# Patient Record
Sex: FEMALE | Race: WHITE | Employment: OTHER | URBAN - METROPOLITAN AREA
[De-identification: names, ages, dates, MRNs, and addresses within clinical notes are randomized per-mention and may not be internally consistent; named-entity substitution may affect disease eponyms.]

---

## 2020-09-18 ENCOUNTER — HOSPITAL ENCOUNTER (INPATIENT)
Age: 50
LOS: 1 days | Discharge: HOME | DRG: 055 | End: 2020-09-19
Attending: EMERGENCY MEDICINE | Admitting: SURGERY
Payer: COMMERCIAL

## 2020-09-18 ENCOUNTER — APPOINTMENT (OUTPATIENT)
Dept: GENERAL RADIOLOGY | Age: 50
DRG: 055 | End: 2020-09-18
Payer: COMMERCIAL

## 2020-09-18 ENCOUNTER — APPOINTMENT (OUTPATIENT)
Dept: CT IMAGING | Age: 50
DRG: 055 | End: 2020-09-18
Payer: COMMERCIAL

## 2020-09-18 PROBLEM — T14.90XA TRAUMA: Status: ACTIVE | Noted: 2020-09-18

## 2020-09-18 PROBLEM — S06.5XAA SUBDURAL HEMATOMA (HCC): Status: ACTIVE | Noted: 2020-09-18

## 2020-09-18 LAB
ABO/RH: NORMAL
ACETAMINOPHEN LEVEL: <5 MCG/ML (ref 10–30)
ALBUMIN SERPL-MCNC: 4.1 G/DL (ref 3.5–5.2)
ALP BLD-CCNC: 63 U/L (ref 35–104)
ALT SERPL-CCNC: 11 U/L (ref 0–32)
ANION GAP SERPL CALCULATED.3IONS-SCNC: 14 MMOL/L (ref 7–16)
ANTIBODY SCREEN: NORMAL
APTT: 26.6 SEC (ref 24.5–35.1)
AST SERPL-CCNC: 18 U/L (ref 0–31)
B.E.: -6.6 MMOL/L (ref -3–3)
BILIRUB SERPL-MCNC: 0.2 MG/DL (ref 0–1.2)
BUN BLDV-MCNC: 17 MG/DL (ref 6–20)
CALCIUM SERPL-MCNC: 9.1 MG/DL (ref 8.6–10.2)
CHLORIDE BLD-SCNC: 102 MMOL/L (ref 98–107)
CO2: 21 MMOL/L (ref 22–29)
COHB: 0.3 % (ref 0–1.5)
COMMENT: ABNORMAL
CREAT SERPL-MCNC: 0.7 MG/DL (ref 0.5–1)
CRITICAL: ABNORMAL
DATE ANALYZED: ABNORMAL
DATE OF COLLECTION: ABNORMAL
ETHANOL: <10 MG/DL (ref 0–0.08)
GFR AFRICAN AMERICAN: >60
GFR NON-AFRICAN AMERICAN: >60 ML/MIN/1.73
GLUCOSE BLD-MCNC: 279 MG/DL (ref 74–99)
HCG QUALITATIVE: NEGATIVE
HCO3: 18.8 MMOL/L (ref 22–26)
HCT VFR BLD CALC: 41.3 % (ref 34–48)
HEMOGLOBIN: 13.3 G/DL (ref 11.5–15.5)
HHB: 0.6 % (ref 0–5)
INR BLD: 1
LAB: ABNORMAL
LACTIC ACID: 1.2 MMOL/L (ref 0.5–2.2)
Lab: ABNORMAL
MCH RBC QN AUTO: 29.4 PG (ref 26–35)
MCHC RBC AUTO-ENTMCNC: 32.2 % (ref 32–34.5)
MCV RBC AUTO: 91.4 FL (ref 80–99.9)
METER GLUCOSE: 350 MG/DL (ref 74–99)
METHB: 0.4 % (ref 0–1.5)
MODE: ABNORMAL
O2 CONTENT: 19.9 ML/DL
O2 SATURATION: 99.4 % (ref 92–98.5)
O2HB: 98.7 % (ref 94–97)
OPERATOR ID: ABNORMAL
PATIENT TEMP: 37 C
PCO2: 37.1 MMHG (ref 35–45)
PDW BLD-RTO: 13.2 FL (ref 11.5–15)
PH BLOOD GAS: 7.32 (ref 7.35–7.45)
PLATELET # BLD: 152 E9/L (ref 130–450)
PMV BLD AUTO: 13.5 FL (ref 7–12)
PO2: 404.7 MMHG (ref 75–100)
POTASSIUM SERPL-SCNC: 3.75 MMOL/L (ref 3.5–5)
POTASSIUM SERPL-SCNC: 4.1 MMOL/L (ref 3.5–5)
PROTHROMBIN TIME: 10.8 SEC (ref 9.3–12.4)
RBC # BLD: 4.52 E12/L (ref 3.5–5.5)
SALICYLATE, SERUM: <0.3 MG/DL (ref 0–30)
SODIUM BLD-SCNC: 137 MMOL/L (ref 132–146)
SOURCE, BLOOD GAS: ABNORMAL
THB: 13.6 G/DL (ref 11.5–16.5)
TIME ANALYZED: 930
TOTAL PROTEIN: 6.6 G/DL (ref 6.4–8.3)
TRICYCLIC ANTIDEPRESSANTS SCREEN SERUM: NEGATIVE NG/ML
WBC # BLD: 13 E9/L (ref 4.5–11.5)

## 2020-09-18 PROCEDURE — 72170 X-RAY EXAM OF PELVIS: CPT

## 2020-09-18 PROCEDURE — 6810039000 HC L1 TRAUMA ALERT

## 2020-09-18 PROCEDURE — 84132 ASSAY OF SERUM POTASSIUM: CPT

## 2020-09-18 PROCEDURE — 72125 CT NECK SPINE W/O DYE: CPT

## 2020-09-18 PROCEDURE — 2060000000 HC ICU INTERMEDIATE R&B

## 2020-09-18 PROCEDURE — 36600 WITHDRAWAL OF ARTERIAL BLOOD: CPT | Performed by: SURGERY

## 2020-09-18 PROCEDURE — 70450 CT HEAD/BRAIN W/O DYE: CPT

## 2020-09-18 PROCEDURE — 99222 1ST HOSP IP/OBS MODERATE 55: CPT | Performed by: SURGERY

## 2020-09-18 PROCEDURE — 2580000003 HC RX 258: Performed by: RADIOLOGY

## 2020-09-18 PROCEDURE — 74176 CT ABD & PELVIS W/O CONTRAST: CPT

## 2020-09-18 PROCEDURE — 71250 CT THORAX DX C-: CPT

## 2020-09-18 PROCEDURE — G0390 TRAUMA RESPONS W/HOSP CRITI: HCPCS

## 2020-09-18 PROCEDURE — 99222 1ST HOSP IP/OBS MODERATE 55: CPT | Performed by: NEUROLOGICAL SURGERY

## 2020-09-18 PROCEDURE — 71045 X-RAY EXAM CHEST 1 VIEW: CPT

## 2020-09-18 PROCEDURE — G0378 HOSPITAL OBSERVATION PER HR: HCPCS

## 2020-09-18 PROCEDURE — 99291 CRITICAL CARE FIRST HOUR: CPT

## 2020-09-18 PROCEDURE — 82805 BLOOD GASES W/O2 SATURATION: CPT

## 2020-09-18 RX ORDER — LEVETIRACETAM 10 MG/ML
1000 INJECTION INTRAVASCULAR ONCE
Status: DISCONTINUED | OUTPATIENT
Start: 2020-09-18 | End: 2020-09-19 | Stop reason: HOSPADM

## 2020-09-18 RX ORDER — DEXTROSE MONOHYDRATE 25 G/50ML
12.5 INJECTION, SOLUTION INTRAVENOUS PRN
Status: DISCONTINUED | OUTPATIENT
Start: 2020-09-18 | End: 2020-09-19 | Stop reason: HOSPADM

## 2020-09-18 RX ORDER — NICOTINE POLACRILEX 4 MG
15 LOZENGE BUCCAL PRN
Status: DISCONTINUED | OUTPATIENT
Start: 2020-09-18 | End: 2020-09-19 | Stop reason: HOSPADM

## 2020-09-18 RX ORDER — ONDANSETRON 2 MG/ML
4 INJECTION INTRAMUSCULAR; INTRAVENOUS EVERY 6 HOURS PRN
Status: DISCONTINUED | OUTPATIENT
Start: 2020-09-18 | End: 2020-09-19 | Stop reason: HOSPADM

## 2020-09-18 RX ORDER — POLYETHYLENE GLYCOL 3350 17 G/17G
17 POWDER, FOR SOLUTION ORAL DAILY PRN
Status: DISCONTINUED | OUTPATIENT
Start: 2020-09-18 | End: 2020-09-19 | Stop reason: HOSPADM

## 2020-09-18 RX ORDER — SODIUM CHLORIDE 0.9 % (FLUSH) 0.9 %
10 SYRINGE (ML) INJECTION PRN
Status: DISCONTINUED | OUTPATIENT
Start: 2020-09-18 | End: 2020-09-19 | Stop reason: HOSPADM

## 2020-09-18 RX ORDER — PROMETHAZINE HYDROCHLORIDE 25 MG/1
12.5 TABLET ORAL EVERY 6 HOURS PRN
Status: DISCONTINUED | OUTPATIENT
Start: 2020-09-18 | End: 2020-09-19 | Stop reason: HOSPADM

## 2020-09-18 RX ORDER — SODIUM CHLORIDE 0.9 % (FLUSH) 0.9 %
10 SYRINGE (ML) INJECTION EVERY 12 HOURS SCHEDULED
Status: DISCONTINUED | OUTPATIENT
Start: 2020-09-18 | End: 2020-09-19 | Stop reason: HOSPADM

## 2020-09-18 RX ORDER — SODIUM CHLORIDE 0.9 % (FLUSH) 0.9 %
10 SYRINGE (ML) INJECTION ONCE
Status: DISCONTINUED | OUTPATIENT
Start: 2020-09-18 | End: 2020-09-18

## 2020-09-18 RX ORDER — ACETAMINOPHEN 325 MG/1
650 TABLET ORAL EVERY 4 HOURS PRN
Status: DISCONTINUED | OUTPATIENT
Start: 2020-09-18 | End: 2020-09-19 | Stop reason: HOSPADM

## 2020-09-18 RX ORDER — DEXTROSE MONOHYDRATE 50 MG/ML
100 INJECTION, SOLUTION INTRAVENOUS PRN
Status: DISCONTINUED | OUTPATIENT
Start: 2020-09-18 | End: 2020-09-19 | Stop reason: HOSPADM

## 2020-09-18 RX ADMIN — Medication 10 ML: at 11:11

## 2020-09-18 ASSESSMENT — ENCOUNTER SYMPTOMS
CHEST TIGHTNESS: 0
RHINORRHEA: 0
COUGH: 0
RESPIRATORY NEGATIVE: 1
TROUBLE SWALLOWING: 0
ABDOMINAL PAIN: 0
BLOOD IN STOOL: 0
ALLERGIC/IMMUNOLOGIC NEGATIVE: 1
EYES NEGATIVE: 1
GASTROINTESTINAL NEGATIVE: 1
VOMITING: 0
NAUSEA: 0
WHEEZING: 0
EYE PAIN: 0
BACK PAIN: 0
DIARRHEA: 0
SHORTNESS OF BREATH: 0

## 2020-09-18 ASSESSMENT — PAIN SCALES - GENERAL: PAINLEVEL_OUTOF10: 0

## 2020-09-18 NOTE — CONSULTS
[address]   Chief Complaint   Patient presents with   [facility] Trauma     Fall down 15 steps. .     Chief Complaint: neck pain, headache and subdural hematoma    HPI:   Mony Glass is a 48 y.o.  female who has history of mild neck pain and headache after a fall down steps earlier today. No new numbness, weakness, vision change or other concern  Pain is achy in character and aggravated by movement, relieved by rest. Head CT reveals right EDH and skull fx. No past medical history on file. No past surgical history on file. No family history on file.    Social History     Socioeconomic History    Marital status: Unknown     Spouse name: Not on file    Number of children: Not on file    Years of education: Not on file    Highest education level: Not on file   Occupational History    Not on file   Social Needs    Financial resource strain: Not on file    Food insecurity     Worry: Not on file     Inability: Not on file    Transportation needs     Medical: Not on file     Non-medical: Not on file   Tobacco Use    Smoking status: Not on file   Substance and Sexual Activity    Alcohol use: Not on file    Drug use: Not on file    Sexual activity: Not on file   Lifestyle    Physical activity     Days per week: Not on file     Minutes per session: Not on file    Stress: Not on file   Relationships    Social connections     Talks on phone: Not on file     Gets together: Not on file     Attends Rastafarian service: Not on file     Active member of club or organization: Not on file     Attends meetings of clubs or organizations: Not on file     Relationship status: Not on file    Intimate partner violence     Fear of current or ex partner: Not on file     Emotionally abused: Not on file     Physically abused: Not on file     Forced sexual activity: Not on file   Other Topics Concern    Not on file   Social History Narrative    Not on file       Medications:   Current Facility-Administered Medications   Medication Dose Route Frequency Provider Last Rate Last Dose    levetiracetam (KEPPRA) 1000 mg/100 mL IVPB  1,000 mg Intravenous Once Anusha Miller MD         Current Outpatient Medications   Medication Sig Dispense Refill    Insulin Pump - insulin lispro Inject into the skin continuous Insulin-to-Carb Ratio (ICR): Insulin Sensitivity Factor (ISF):  mg/dL per unit of insulin  Target Blood Glucose: mg/dL  Bolus Frequency:          Allergies:    Patient has no allergy information on record. Review of Systems   Constitutional: Negative. HENT: Negative. Eyes: Negative. Respiratory: Negative. Cardiovascular: Negative. Gastrointestinal: Negative. Endocrine: Negative. Genitourinary: Negative. Musculoskeletal: Negative. Skin: Negative. Allergic/Immunologic: Negative. Neurological: Negative. Hematological: Negative. Psychiatric/Behavioral: Negative. Physical Exam     /74   Pulse 88   Resp 21   Ht 5' 6\" (1.676 m)   Wt 125 lb (56.7 kg)   SpO2 99%   BMI 20.18 kg/m²    Physical Exam   Constitutional: Appears well-nourished. Head: Normocephalic and atraumatic. Eyes: EOM are normal. Pupils are equal, round, and reactive to light. Neck: Normal range of motion. No tracheal deviation present. Cardiovascular: Normal rate. Pulmonary/Chest: No stridor. Abdominal: No distension. Neurological:   Oriented to person, place, and time. CN 3-12 intact  Motor strength full  Sensation intact to light touch   Skin: Skin is warm and dry.    Psychiatric: Thought content normal.     Assessment:   · 48year old female with right SDH/skull fx after fall earlier today - stable exam    Plan:  · No anticoagulants  · No surgical intervention planned  · Serial neuro exams  · F/u in clinic in 4 weeks with head CT      Electronically signed by ANISA Arita on 9/18/2020 at 11:45 AM     I have interviewed and examined the patient and agree with above. She has right SDH. Stable.   Will follow    Toñito Burns

## 2020-09-18 NOTE — ED PROVIDER NOTES
[facility]  eMERGENCY dEPARTMENT eNCOUnter      Pt Name: Ortiz Anand  MRN: [id number]  [place] 1970  Date of evaluation: 9/18/2020  Provider: Jodi Grant MD     CHIEF COMPLAINT       Chief Complaint   Patient presents with    Trauma     Fall down 15 steps. HISTORY OF PRESENT ILLNESS   (Location/Symptom, Timing/Onset,Context/Setting, Quality, Duration, Modifying Factors, Severity) Note limiting factors. Patient presents here for evaluation of trauma. Patient is an insulin-dependent diabetic type I. She was found at the bottom of 15 concrete stairs. There was a large puddle of blood. Her blood sugar was 24. There was a reported loss of consciousness. Patient was given glucagon possible oral glucose. She now is awake and alert. She is somewhat argumentative and repetitive in her questions. She arrived on a cot with no c-collar. C-collar was placed on arrival.  She does have a laceration to the right occipital area. Apparently there was a large amount of blood at the scene but bleeding is currently controlled. She denies any chest pain or shortness of breath. He denies any other pain. Patient was made a level 2 trauma prior to arrival and trauma team was present on patient's arrival          Ortiz Anand is a 48 y.o. female who presents to the emergency department      Nursing Notes were reviewed. REVIEW OF SYSTEMS    (2+ for4; 10+ for level 5)     Review of Systems   Constitutional: Negative for appetite change, chills, fatigue, fever and unexpected weight change. HENT: Negative for congestion, drooling, nosebleeds, rhinorrhea and trouble swallowing. Eyes: Negative for pain and visual disturbance. Respiratory: Negative for cough, chest tightness, shortness of breath and wheezing. Cardiovascular: Negative for chest pain, palpitations and leg swelling.    Gastrointestinal: Negative for abdominal pain, blood in stool, diarrhea, nausea and vomiting. Endocrine: Negative for polydipsia and polyuria. Genitourinary: Negative for difficulty urinating, dysuria, flank pain, frequency, hematuria and urgency. Musculoskeletal: Negative for arthralgias, back pain, myalgias and neck pain. Skin: Positive for wound. Negative for pallor and rash. Allergic/Immunologic: Negative for environmental allergies. Neurological: Negative for dizziness, syncope, speech difficulty, weakness, light-headedness, numbness and headaches. Hematological: Does not bruise/bleed easily. Psychiatric/Behavioral: Negative for confusion and decreased concentration. All other systems reviewed and are negative. PAST MEDICAL HISTORY   No past medical history on file. SURGICALHISTORY     No past surgical history on file. CURRENT MEDICATIONS       Previous Medications    No medications on file            Patient has no allergy information on record. FAMILY HISTORY     No family history on file.        SOCIAL HISTORY       Social History     Socioeconomic History    Marital status: Unknown     Spouse name: Not on file    Number of children: Not on file    Years of education: Not on file    Highest education level: Not on file   Occupational History    Not on file   Social Needs    Financial resource strain: Not on file    Food insecurity     Worry: Not on file     Inability: Not on file    Transportation needs     Medical: Not on file     Non-medical: Not on file   Tobacco Use    Smoking status: Not on file   Substance and Sexual Activity    Alcohol use: Not on file    Drug use: Not on file    Sexual activity: Not on file   Lifestyle    Physical activity     Days per week: Not on file     Minutes per session: Not on file    Stress: Not on file   Relationships    Social connections     Talks on phone: Not on file     Gets together: Not on file     Attends Cheondoism service: Not on file     Active member of club or organization: Not on file     Attends meetings of clubs or organizations: Not on file     Relationship status: Not on file    Intimate partner violence     Fear of current or ex partner: Not on file     Emotionally abused: Not on file     Physically abused: Not on file     Forced sexual activity: Not on file   Other Topics Concern    Not on file   Social History Narrative    Not on file       SCREENINGS    [place] Coma Scale  Eye Opening: Spontaneous  Best Verbal Response: Oriented  Best Motor Response: Obeys commands  [place] Coma Scale Score: 15 @FLOW([id number])@    PHYSICAL EXAM    (5+ for level 4, 8+ for level 5)     ED Triage Vitals   BP Temp Temp src Pulse Resp SpO2 Height Weight   09/18/20 0918 -- -- 09/18/20 0922 09/18/20 0922 09/18/20 0922 -- --   132/78   93 16 100 %         Physical Exam  Vitals signs and nursing note reviewed. Constitutional:       General: She is not in acute distress. Appearance: Normal appearance. She is well-developed and normal weight. She is not diaphoretic. HENT:      Head: Normocephalic. Comments: Laceration to the right occipital area. Is difficult to really evaluate because there is a lot of clotted blood tangled with her hair. There is no current active bleeding no underlying skull defects are felt     Right Ear: Tympanic membrane normal.      Left Ear: Tympanic membrane normal.      Nose: Nose normal. No congestion or rhinorrhea. Mouth/Throat:      Mouth: Mucous membranes are moist.      Pharynx: No oropharyngeal exudate. Eyes:      General: No scleral icterus. Right eye: No discharge. Left eye: No discharge. Extraocular Movements: Extraocular movements intact. Conjunctiva/sclera: Conjunctivae normal.      Pupils: Pupils are equal, round, and reactive to light. Neck:      Thyroid: No thyromegaly. Vascular: No JVD. Trachea: No tracheal deviation.       Comments: C-collar was placed on arrival  Cardiovascular: Rate and Rhythm: Normal rate and regular rhythm. Heart sounds: Normal heart sounds. No murmur. No gallop. Pulmonary:      Effort: Pulmonary effort is normal. No respiratory distress. Breath sounds: Normal breath sounds. No stridor. No wheezing or rales. Chest:      Chest wall: No tenderness. Abdominal:      General: Abdomen is flat. There is no distension. Palpations: Abdomen is soft. There is no mass. Tenderness: There is no abdominal tenderness. There is no guarding or rebound. Musculoskeletal: Normal range of motion. General: No tenderness or deformity. Lymphadenopathy:      Cervical: No cervical adenopathy. Skin:     General: Skin is warm and dry. Coloration: Skin is not pale. Findings: No erythema or rash. Neurological:      General: No focal deficit present. Mental Status: She is alert. Cranial Nerves: No cranial nerve deficit. Motor: No abnormal muscle tone. Coordination: Coordination normal.      Comments: Sedative questions. Patient does know where she is and her name. Minute if with treatments   Psychiatric:         Behavior: Behavior normal.         Thought Content: Thought content normal.         DIAGNOSTIC RESULTS     EKG (Per Emergency Physician):       RADIOLOGY (Per Tifafny Jimenez): Interpretation per the Radiologist below, if available at the time of this note:  Ct Abdomen Pelvis Wo Contrast Additional Contrast? None    Result Date: 2020  Patient MRN:  [id number] : 1970 Age: 48 years Gender: Female Order Date:  2020 9:34 AM EXAM: CT CHEST WO CONTRAST, CT ABDOMEN PELVIS WO CONTRAST INDICATION:  trauma TECHNIQUE: Axial images through the chest without IV contrast. Axial and coronal 2-D reconstructions with soft tissue windows. Axial and sagittal 2-D reconstructions with lung windows. Dose reduction techniques were used with automated exposure control.  Patient also had CT abdomen and pelvis done without any IV or oral contrast, with axial sagittal and coronal 2-D reconstructions in soft tissue windows. Contrast is none. Dose is total .13 MG Y CM for the chest.. DLP is 832.39 for the abdomen and pelvis. COMPARISON: Portable chest 9/18/2020. FINDINGS:  CT chest: There is no acute rib fracture or lung contusions. Slight hypoventilation at the lung bases. No infiltrate, congestion, effusion or pneumothorax. No emphysematous changes. Heart size normal. No pericardial effusion. No mediastinal hematoma. Aorta has normal size. Normal thyroid. Esophagus is not dilated. No adenopathy. Both breasts are present. Thoracic spine: A mild superior endplate compression fracture at T12 with minimal splaying of the superior margin. Uncertain age but could be chronic. There is also slight superior endplate scalloping at T9 and T10 which looks probably chronic. Small superior endplate Schmorl's node deformity at T7 looks chronic. No obvious fracture of the sternum or ribs. CT ABDOMEN: Acute hematoma in the lateral right flank at the skin and subcutaneous fat with associated haziness, located above the iliac crest. No evidence of hernia or hematoma in the muscles. No intraperitoneal hematoma, free fluid or free air. Gallbladder is distended with faint dependent sludge, no calcified gallstones. The common bile duct measures maximum 3 mm. The intrahepatic ducts bile ducts are not dilated. Noncontrast liver is grossly negative. Negative noncontrast spleen, pancreas, adrenal glands, kidneys, aorta, IVC. Stomach is mildly distended with retained fluid. Small bowel loops are not distended. Pelvis: No obvious hematoma or free fluid in the pelvis. The urinary bladder was very distended at the time of exam. Small uterus and ovaries. Moderate constipation. No obvious bowel wall thickening. Normal tiny appendix posterior to the cecum in the right lower quadrant.  Small bowel loops are not obstructed and do not show any significant fluid ALERT:  THIS IS AN ABNORMAL REPORT Extensive right-sided skull fracture associated with a small right-sided epidural hematoma and trace right to left midline shift. Ct Chest Wo Contrast    Result Date: 2020  Patient MRN:  [id number] : 1970 Age: 48 years Gender: Female Order Date:  2020 9:34 AM EXAM: CT CHEST WO CONTRAST, CT ABDOMEN PELVIS WO CONTRAST INDICATION:  trauma TECHNIQUE: Axial images through the chest without IV contrast. Axial and coronal 2-D reconstructions with soft tissue windows. Axial and sagittal 2-D reconstructions with lung windows. Dose reduction techniques were used with automated exposure control. Patient also had CT abdomen and pelvis done without any IV or oral contrast, with axial sagittal and coronal 2-D reconstructions in soft tissue windows. Contrast is none. Dose is total .13 MG Y CM for the chest.. DLP is 832.39 for the abdomen and pelvis. COMPARISON: Portable chest 2020. FINDINGS:  CT chest: There is no acute rib fracture or lung contusions. Slight hypoventilation at the lung bases. No infiltrate, congestion, effusion or pneumothorax. No emphysematous changes. Heart size normal. No pericardial effusion. No mediastinal hematoma. Aorta has normal size. Normal thyroid. Esophagus is not dilated. No adenopathy. Both breasts are present. Thoracic spine: A mild superior endplate compression fracture at T12 with minimal splaying of the superior margin. Uncertain age but could be chronic. There is also slight superior endplate scalloping at T9 and T10 which looks probably chronic. Small superior endplate Schmorl's node deformity at T7 looks chronic. No obvious fracture of the sternum or ribs. CT ABDOMEN: Acute hematoma in the lateral right flank at the skin and subcutaneous fat with associated haziness, located above the iliac crest. No evidence of hernia or hematoma in the muscles. No intraperitoneal hematoma, free fluid or free air.  Gallbladder is distended with faint dependent sludge, no calcified gallstones. The common bile duct measures maximum 3 mm. The intrahepatic ducts bile ducts are not dilated. Noncontrast liver is grossly negative. Negative noncontrast spleen, pancreas, adrenal glands, kidneys, aorta, IVC. Stomach is mildly distended with retained fluid. Small bowel loops are not distended. Pelvis: No obvious hematoma or free fluid in the pelvis. The urinary bladder was very distended at the time of exam. Small uterus and ovaries. Moderate constipation. No obvious bowel wall thickening. Normal tiny appendix posterior to the cecum in the right lower quadrant. Small bowel loops are not obstructed and do not show any significant fluid retention. Lumbar spine: Mild superior endplate compression fracture at T12. No lumbar spine fracture or compression deformity. No spinal stenosis. No fracture of the upper pelvis or hips. The inferior pubic rami are not included. 1. Small acute hematoma lateral right flank with edema of the subcutaneous fat. 2. There are multiple mild thoracic compression deformities, although these look probably chronic. Correlate with level of spine pain. 3. No lumbar fracture. 4. No acute traumatic injury within the chest, abdomen or pelvis. Ct Cervical Spine Wo Contrast    Result Date: 2020  Patient MRN:  [id number] : 1970 Age: 48 years Gender: Female Order Date:  2020 9:34 AM EXAM: CT CERVICAL SPINE WO CONTRAST Technique: Low-dose CT  acquisition technique included one of following options; 1 . Automated exposure control, 2. Adjustment of MA and or KV according to patient's size. 3. Use of interactive reconstruction. Dosage: 729 mGy-cm. INDICATION:  trauma trauma COMPARISON: None FINDINGS:  Vertebral body height and alignment are normal. There is mild disc space narrowing at C5-C6. Remainder of the disc spaces are normal. There are no fractures. There is no canal stenosis or foraminal narrowing.  Visualized lung apices and soft tissues of the neck have no acute process. No acute cervical abnormality     Xr Chest 1 View    Result Date: 2020  Patient MRN:  [id number] : 1970 Age: 48 years Gender: Female Order Date:  2020 9:50 AM EXAM: XR CHEST 1 VIEW INDICATION:  TRAUMA TRAUMA FALL COMPARISON: None FINDINGS:  Heart Size is normal. There are no infiltrates or effusions. There is no pneumothorax. There is no rib fractures. Normal chest       :  Labs Reviewed   BLOOD GAS, ARTERIAL - Abnormal; Notable for the following components:       Result Value    pH, Blood Gas 7.322 (*)     PO2 404.7 (*)     HCO3 18.8 (*)     B.E. -6.6 (*)     O2 Sat 99.4 (*)     O2Hb 98.7 (*)     All other components within normal limits   CBC - Abnormal; Notable for the following components:    WBC 13.0 (*)     MPV 13.5 (*)     All other components within normal limits   COMPREHENSIVE METABOLIC PANEL - Abnormal; Notable for the following components:    CO2 21 (*)     Glucose 279 (*)     All other components within normal limits   SERUM DRUG SCREEN - Abnormal; Notable for the following components:    Acetaminophen Level <5.0 (*)     All other components within normal limits   POCT GLUCOSE - Abnormal; Notable for the following components:    Meter Glucose 350 (*)     All other components within normal limits   PROTIME-INR   APTT   LACTIC ACID, PLASMA   HCG, SERUM, QUALITATIVE   TYPE AND SCREEN       All other labs were within normal range or not returned as of this dictation.     EMERGENCY DEPARTMENT COURSE and DIFFERENTIALDIAGNOSIS/MDM:   Vitals:    Vitals:    20 0925 20 0930 20 0937 20 0939   BP: 133/76 126/72 105/61    Pulse: 92 98 97    Resp: 20 19 (!) 6    SpO2: 100% 99% 99%    Weight:    125 lb (56.7 kg)   Height:    5' 6\" (1.676 m)       Medications   levetiracetam (KEPPRA) 1000 mg/100 mL IVPB (has no administration in time range)       MDM  Number of Diagnoses or Management Options  Diagnosis management comments: Presented here after falling down 15 stairs and being found unconscious. She did have a low sugar. She does have repetitive questions. She has obvious signs of head trauma and a head injury. She had no abdominal pain. Patient was a level 2 trauma on arrival.  She is awake alert and oriented with stable vital signs. Her evaluation here does reveal skull fracture with small subdural hematoma. She also has a flank hematoma but no intra-abdominal process. Patient is being admitted at this time. Critical care 30 minutes for assessment of traumatic injuries and evaluation of studies. Is in addition to resident involvement  . CONSULTS:  IP CONSULT TO NEUROSURGERY    PROCEDURES:  Unless otherwise noted below, none     Procedures    FINAL IMPRESSION      1. Multiple trauma    2. Open fracture of parietal bone, initial encounter ([facility] [address]    3. Subdural hematoma due to concussion, with loss of consciousness, initial encounter [facility])        DISPOSITION/PLAN   DISPOSITION Decision To Admit 09/18/2020 10:30:10 AM      PATIENT REFERRED TO:  No follow-up provider specified. DISCHARGE MEDICATIONS:  New Prescriptions    No medications on file          (Please note:  Portions of this note were completed with a voice recognition program.  Efforts were made to edit thedictations but occasionally words and phrases are mis-transcribed.)    Form v2016. J.5-cn    Timo Garcia MD (electronically signed)  Emergency Medicine Provider         Timo Garcia MD  09/18/20 7173

## 2020-09-18 NOTE — ED NOTES
Pt refusing Keppra stating that she can not have toxins in her body.       Kye Montgomery RN  09/18/20 5438

## 2020-09-18 NOTE — ED NOTES
Pt log rolled. Spinal precautions maintained. No signs of trauma. No tenderness to palpation. No step offs or deformities noted.       Amari Christina RN  09/18/20 [id number]

## 2020-09-18 NOTE — PROGRESS NOTES
Pt does not want to order food from our menu or drink anything from our facility. Daughter brought her in a bottle of water. Pt states she has a couple food bars in her purse that she will eat if she needs to. Pt also wants to monitor her own sugar and give herself her own insulin.

## 2020-09-18 NOTE — ED NOTES
Per EMS, pt was found at bottom of stairs with BGL of 20. 1 mg Glucagon given.       Ernesto Acosta RN  09/18/20 5650

## 2020-09-18 NOTE — H&P
NSAID use in last 72 hours: no  Taken PCN in past:  unknown  Last food/drink: Unknown  Last tetanus: Unknown    Family History:   Not pertinent to presenting problem. Complaints:   Head: Moderate  Neck:   None  Chest:   None  Back:   Mild  Abdomen:   None  Extremities:   None  Comments:     Review of systems:  All negative unless otherwise noted. SECONDARY SURVEY  Head/scalp: Right sided scalp hematoma    Face: Atraumatic    Eyes/ears/nose: Atraumatic    Pharynx/mouth: Atraumatic    Neck: Atraumatic     Cervical spine tenderness:   Cervical collar placed on patient at time of arrival  Pain:  mild  ROM:  Not indicated     Chest wall:  Atraumatic    Heart:  Regular rate & rhythm    Abdomen: Atraumatic. Soft ND  Tenderness:  none    Pelvis: Atraumatic  Tenderness: none    Thoracolumbar spine: Atraumatic  Tenderness:  Tenderness to L flank    Genitourinary:  Atraumatic. No blood or urine noted    Rectum: Atraumatic. No blood noted. Perineum: Atraumatic. No blood or urine noted. Extremities:   Sensory normal  Motor normal    Distal Pulses  Left arm normal  Right arm normal  Left leg normal  Right leg normal    Capillary refill  Left arm normal  Right arm normal  Left leg normal  Right leg normal    Procedures in ED:  Femoral arterial puncture and Femoral venipuncture    In the event of Emergency Blood Transfusion:  Due to the critical condition of this patient, I request the immediate release of blood products for emergency transfusion secondary to shock. I understand the increased risks incurred by the lack of complete transfusion testing. Radiology: Chest Xray, Pelvic Xray, Ct head, Ct cervical spine, CT chest, CT abdomen    Consultations:  Neurosurgery    Admission/Diagnosis: R Epidural hematoma    Plan of Treatment:  - Have patient remain in ED until repeat CT head obtained at 1400. If stable, admit to floor.  If worse, admit to ICU  - Keppra  - Tertiary exam   - Follow up labs  -

## 2020-09-18 NOTE — ED NOTES
Peripheral labs drawn via R AC by Alicia Cody [facility].       Víctor Ogden RN  09/18/20 7446

## 2020-09-18 NOTE — ED NOTES
Pt took aspen collar off stating that she is \"the person in the bed\" and she wants \"to make her own decisions\". Pt instructed on need for aspen collar. Pt refusing to put aspen collar back on.       Oanh Jackson RN  09/18/20 0695 3+ edema b/l

## 2020-09-18 NOTE — PROGRESS NOTES
TRAUMA TERTIARY    Admit Date: 9/18/2020    Other Fall down stairs    CC:    Chief Complaint   Patient presents with    Trauma     Fall down 15 steps. Alcohol pre-screening:  How many times in the past year have you had 4-5 or more drinks in a day?  none    Subjective:       Patient seen & examined resting comfortably in bed. No new pain. Patient removed self-collar prior to evaluation, refused to put back on. Objective:     Patient Vitals for the past 8 hrs:   BP Temp Temp src Pulse Resp SpO2   09/18/20 1728 126/79 98.4 °F (36.9 °C) Temporal 97 15 96 %   09/18/20 1604 124/73 -- -- 97 24 100 %   09/18/20 1300 118/72 -- -- 98 26 98 %   09/18/20 1201 127/81 -- -- 98 19 99 %   09/18/20 1130 127/70 -- -- 91 21 98 %   09/18/20 1046 125/74 -- -- 88 21 99 %   09/18/20 1030 126/81 -- -- 94 27 99 %   09/18/20 1015 124/73 -- -- 92 20 97 %       No intake/output data recorded. No intake/output data recorded. Radiology:  CT HEAD WO CONTRAST   Final Result      NO ACUTE INTRACRANIAL PROCESS      Right parietal near the vertex scalp hematoma         CT HEAD WO CONTRAST   Final Result   ALERT:  THIS IS AN ABNORMAL REPORT      Extensive right-sided skull fracture associated with a small   right-sided epidural hematoma and trace right to left midline shift. CT CERVICAL SPINE WO CONTRAST   Final Result   No acute cervical abnormality         CT CHEST WO CONTRAST   Final Result      1. Small acute hematoma lateral right flank with edema of the   subcutaneous fat. 2. There are multiple mild thoracic compression deformities, although   these look probably chronic. Correlate with level of spine pain. 3. No lumbar fracture. 4. No acute traumatic injury within the chest, abdomen or pelvis. CT ABDOMEN PELVIS WO CONTRAST Additional Contrast? None   Final Result      1. Small acute hematoma lateral right flank with edema of the   subcutaneous fat.          2. There are multiple mild thoracic compression deformities, although   these look probably chronic. Correlate with level of spine pain. 3. No lumbar fracture. 4. No acute traumatic injury within the chest, abdomen or pelvis. XR PELVIS (1-2 VIEWS)   Final Result   No significant abnormal findings. XR CHEST 1 VIEW   Final Result   Normal chest                   PHYSICAL EXAM:   GCS:  4 - Opens eyes on own   6 - Follows simple motor commands  5 - Alert and oriented    Pupil size: Left 4 mm     Right 4 mm  Pupil reaction: Yes  Wiggles fingers: Left Yes     Right Yes  Wiggles toes: Left Yes     Right Yes  Plantar flexion: Left normal     Right normal    GENERAL:  NAD. A&Ox3. HEAD:  R side scalp hematoma. Normocephalic. LUNGS:  No increased work of breathing. CTAB. CARDIOVASCULAR: RR  ABDOMEN:  Soft, non-distended, non-tender. No guarding, rigidity, rebound. EXTREMITIES:  MAEx4. No LE edema. Atraumatic. SKIN:  Warm and dry      Spine:       Spine Tenderness ROM   Cervical 0 /10 Normal   Thoracic 0 /10 Normal   Lumbar 0 /10 Normal     Musculoskeletal:    Joint Tenderness Swelling/Deformity ROM   Right shoulder absent absent normal   Left shoulder absent absent normal   Right elbow absent absent normal   Left elbow absent absent normal   Right wrist absent absent normal   Left wrist absent absent normal   Right hand grasp absent absent normal   Left hand grasp absent absent normal   Right hip absent absent normal   Left hip absent absent normal   Right knee absent absent normal   Left knee absent absent normal   Right ankle absent absent normal   Left ankle absent absent normal   Right foot absent absent normal   Left foot absent absent normal         CONSULTS: Neurosurgery      Active Problems:    Trauma    Subdural hematoma (HCC)  Resolved Problems:    * No resolved hospital problems. *        Assessment/Plan:     Neuro:  R SDH. Serial neuro exams No acute issues. GCS 15. Pain control. CV: No acute issues.

## 2020-09-18 NOTE — PROGRESS NOTES
Patient s/e due to refusal of medications. Discussed at length (> 30 minutes) about CT findings including Epidural hematoma, multiple skull fractures. Unable to delineate if patient continues to perseverate due to TBI, as I discussed risk and benefits of keppra. Discussed indication of prophylaxis for reduction of seizures and risks including but not limited to behavioral problems, headaches, drowsiness, nausea and vomiting. Stated the benefits in the medication do outweigh the risks. Patient repeated multiple times she is \"allergic to toxins\". That she can only drink a specific spring water because it does not have toxins and she also states that \"all prescription medications have toxins in them and they give me headaches, make my kidneys and liver shut down\". She repeated the statement multiple times. Ultimately, she stated \"I do not want the Keppra now, only if I were to get worse\".      Electronically signed by Lobo Hays MD on 9/18/2020 at 12:29 PM

## 2020-09-19 VITALS
SYSTOLIC BLOOD PRESSURE: 133 MMHG | WEIGHT: 125 LBS | HEIGHT: 66 IN | RESPIRATION RATE: 20 BRPM | HEART RATE: 82 BPM | BODY MASS INDEX: 20.09 KG/M2 | DIASTOLIC BLOOD PRESSURE: 78 MMHG | TEMPERATURE: 97.1 F | OXYGEN SATURATION: 98 %

## 2020-09-19 LAB
ANION GAP SERPL CALCULATED.3IONS-SCNC: 13 MMOL/L (ref 7–16)
BASOPHILS ABSOLUTE: 0.03 E9/L (ref 0–0.2)
BASOPHILS RELATIVE PERCENT: 0.5 % (ref 0–2)
BUN BLDV-MCNC: 13 MG/DL (ref 6–20)
CALCIUM SERPL-MCNC: 9.1 MG/DL (ref 8.6–10.2)
CHLORIDE BLD-SCNC: 102 MMOL/L (ref 98–107)
CO2: 23 MMOL/L (ref 22–29)
CREAT SERPL-MCNC: 0.8 MG/DL (ref 0.5–1)
EOSINOPHILS ABSOLUTE: 0.02 E9/L (ref 0.05–0.5)
EOSINOPHILS RELATIVE PERCENT: 0.3 % (ref 0–6)
GFR AFRICAN AMERICAN: >60
GFR NON-AFRICAN AMERICAN: >60 ML/MIN/1.73
GLUCOSE BLD-MCNC: 116 MG/DL (ref 74–99)
HCT VFR BLD CALC: 38.6 % (ref 34–48)
HEMOGLOBIN: 12.7 G/DL (ref 11.5–15.5)
IMMATURE GRANULOCYTES #: 0.02 E9/L
IMMATURE GRANULOCYTES %: 0.3 % (ref 0–5)
LYMPHOCYTES ABSOLUTE: 1.38 E9/L (ref 1.5–4)
LYMPHOCYTES RELATIVE PERCENT: 23.7 % (ref 20–42)
MCH RBC QN AUTO: 29.6 PG (ref 26–35)
MCHC RBC AUTO-ENTMCNC: 32.9 % (ref 32–34.5)
MCV RBC AUTO: 90 FL (ref 80–99.9)
MONOCYTES ABSOLUTE: 0.54 E9/L (ref 0.1–0.95)
MONOCYTES RELATIVE PERCENT: 9.3 % (ref 2–12)
NEUTROPHILS ABSOLUTE: 3.83 E9/L (ref 1.8–7.3)
NEUTROPHILS RELATIVE PERCENT: 65.9 % (ref 43–80)
PDW BLD-RTO: 13.2 FL (ref 11.5–15)
PLATELET # BLD: 157 E9/L (ref 130–450)
PMV BLD AUTO: 12.8 FL (ref 7–12)
POTASSIUM REFLEX MAGNESIUM: 4 MMOL/L (ref 3.5–5)
RBC # BLD: 4.29 E12/L (ref 3.5–5.5)
SODIUM BLD-SCNC: 138 MMOL/L (ref 132–146)
WBC # BLD: 5.8 E9/L (ref 4.5–11.5)

## 2020-09-19 PROCEDURE — 92523 SPEECH SOUND LANG COMPREHEN: CPT

## 2020-09-19 PROCEDURE — G0378 HOSPITAL OBSERVATION PER HR: HCPCS

## 2020-09-19 PROCEDURE — 97535 SELF CARE MNGMENT TRAINING: CPT

## 2020-09-19 PROCEDURE — 99232 SBSQ HOSP IP/OBS MODERATE 35: CPT | Performed by: SURGERY

## 2020-09-19 PROCEDURE — 85025 COMPLETE CBC W/AUTO DIFF WBC: CPT

## 2020-09-19 PROCEDURE — 36415 COLL VENOUS BLD VENIPUNCTURE: CPT

## 2020-09-19 PROCEDURE — 0HQ0XZZ REPAIR SCALP SKIN, EXTERNAL APPROACH: ICD-10-PCS | Performed by: STUDENT IN AN ORGANIZED HEALTH CARE EDUCATION/TRAINING PROGRAM

## 2020-09-19 PROCEDURE — 97165 OT EVAL LOW COMPLEX 30 MIN: CPT

## 2020-09-19 PROCEDURE — 2580000003 HC RX 258: Performed by: STUDENT IN AN ORGANIZED HEALTH CARE EDUCATION/TRAINING PROGRAM

## 2020-09-19 PROCEDURE — 80048 BASIC METABOLIC PNL TOTAL CA: CPT

## 2020-09-19 RX ORDER — LEVETIRACETAM 500 MG/1
500 TABLET ORAL 2 TIMES DAILY
Qty: 13 TABLET | Refills: 0 | Status: SHIPPED | OUTPATIENT
Start: 2020-09-19 | End: 2020-09-19 | Stop reason: SDUPTHER

## 2020-09-19 RX ORDER — LEVETIRACETAM 500 MG/1
500 TABLET ORAL 2 TIMES DAILY
Qty: 13 TABLET | Refills: 0 | Status: ON HOLD | OUTPATIENT
Start: 2020-09-19 | End: 2021-11-07

## 2020-09-19 RX ORDER — LIDOCAINE HYDROCHLORIDE AND EPINEPHRINE 10; 10 MG/ML; UG/ML
20 INJECTION, SOLUTION INFILTRATION; PERINEURAL ONCE
Status: DISCONTINUED | OUTPATIENT
Start: 2020-09-19 | End: 2020-09-19 | Stop reason: HOSPADM

## 2020-09-19 RX ADMIN — SODIUM CHLORIDE, PRESERVATIVE FREE 10 ML: 5 INJECTION INTRAVENOUS at 09:08

## 2020-09-19 ASSESSMENT — PAIN SCALES - GENERAL
PAINLEVEL_OUTOF10: 0

## 2020-09-19 NOTE — PROGRESS NOTES
Occupational Therapy  OCCUPATIONAL THERAPY INITIAL EVALUATION      Date:2020  Patient Name: Seema Potts  MRN: [id number]  : 1970  Room: [address] [place] [address]    Evaluating OT: Lara Perales OTR/L   Referring provider:   Omayra Moreno MD       AM-[facility] Daily Activity Raw Score: 22/24  Recommended Adaptive Equipment: Shower chair     Diagnosis: Trauma; skull fx and SDH  Surgery: laceration repair     Pertinent Medical History: type 1 diabetic   Additional information: pt s/p fall down steps to basement     Precautions:  Falls, smell sensitivity      Home Living: Pt lives with children (16 1/3yo-able to help)  in a 2 story house with 2 step(s) to enter and 0 rail(s); bed on 1st floor, 1/2 bath on 1st, full bath on 2nd; laundry in basement-kids able to help  Bathroom setup: Tub-Shower combo  Equipment owned: no DME    Prior Level of Function: IND with ADLs IND with IADLs; using no AD for ambulation.  +homemaking   Driving: yes    Pain Level: head/neck pain RN aware  Cognition: A&O: 3; Follows 1-2 step directions   Memory: F-; limited recall of events   Sequencing: F+   Problem solving: F+/G   Judgement/safety: G     Functional Assessment:   Initial Eval Status  Date:  Treatment Status  Date: Short Term Goals  Treatment frequency: 1-3x/week on PRN    Feeding IND     Grooming/Hygiene IND  Standing at sink        UB Dressing Min A  Limited by pain and baseline pain in R shoudler   IND      LB Dressing Mod I increased time    IND      Bathing NT;   Pt edu on use of shower chair at home for safety and Energy conservation and work simplification techniques       Toileting IND  Standard commode including clothing mgmt and hygiene        Bed Mobility  Supine to sit: Supervision  Sit to supine: Supervision  Min cues for log roll to decrease pain     Functional Transfers Sit to stand: IND  Stand to sit: IND       Functional Mobility IND  In room/bathroom no AD  Pt reports getting up to go bathroom the above-mentioned ADLs; training on proper hand placement, safety technique, sequencing, and energy conservation/adapted techniques during ADLs and while completing functional transfers. ? Education: OT POC, OT role, Importance of completing ADL tasks daily as IND as possible to aide in recovery process, fall risk precautions, being OOB to chair for all meals, building upright tolerance, homegoing safety and ROM exercises at home . Eval Complexity:   low Complexity    Assessment of current deficits:   Functional mobility []  ADLs [x] Strength [x]  Cognition []  Functional transfers  [] IADLs [x] Safety Awareness []  Endurance [x]  Fine Motor Coordination [] Balance [] Vision/perception [] Sensation []   Gross Motor Coordination [] ROM [x] Delirium []                  Motor Control []    Plan of Care: OT 1-3x/week for 5-7 days during hospitalization   ADL retraining/AE recommendations   Energy Conservation Techniques/Strategies    Therapeutic Exercise   Patient and/or Family Education to Increase Safety and Functional Barry       Rehab Potential: Good for established goals    Patient / Family Goal: to go home asap    Patient and/or family were instructed/educated on diagnosis, prognosis/goals and plan of care. Demonstrated F+ understanding, further information could be needed. [] Malnutrition indicators have been identified and nursing has been notified to ensure a dietitian consult is ordered.       low Evaluation + 15tx mins  Time In:1030 and 1120          Time Out: 1040 and 1145        *physicians present to perform bedside lac repair, therapist returned once completed          Treatment Charges: Mins Units   Ther Ex  [id number]       Manual Therapy [address] [facility] [id number]     ADL/Home Mgt [id number]  15     Neuro Re-ed [id number]       Orthotic manage/training  [id number]       Non-Billable Time       Total Timed Treatment 15 1      Evaluation time includes thorough review of current medical information, gathering information on past medical history/social history and prior level of function, completion of standardized testing/informal observation of tasks, assessment of data and development of POC/Goals.      Juan Carlos Proctor, OTR/L   [id number]
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
## 2020-09-19 NOTE — PROGRESS NOTES
Physical Therapy    Facility/Department: [facility]    NAME: Philippe Marie  : 1970  MRN: [id number]    Date of Service: 2020    Order for PT evaluation received and appreciated. The pt was seen and was able to perform all functional mobility including bed mobility, transfers, ambulation >200 feet, and stair negotiation x10 with a single rail with Peck. BLE strength was 5/5 with no noted asymmetry. The pt reported this is her first fall she can remember and attributes the fall to low BS as she is a type 1 diabetic. The pt demonstrated no need for inpatient skilled PT services. Bob Soto.  Rosio Carey, [address]  number:  PT [phone]
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
## 2020-09-19 NOTE — PROGRESS NOTES
Patient stated 'I am very sensitive  to all prescription medications, everything I take has to be all natural!\"
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
## 2020-09-21 NOTE — PROGRESS NOTES
PCP is Marcos Wu DO  Office notified of admission.       Electronically signed by Katie Guillaume RN MSN APRN-NP [facility] NP  [facility] CCRN 9/21/2020 10:18 AM

## 2020-10-02 ENCOUNTER — TELEPHONE (OUTPATIENT)
Dept: NEUROSURGERY | Age: 50
End: 2020-10-02

## 2020-10-02 NOTE — TELEPHONE ENCOUNTER
Pt called and stated her face is swollen. Mostly on the right side. Above her eye, forehead,  temple area, and above jaw line. Also some swelling on the left side as well and did not have that before. Pt stated it seems like so much blood on top of head and around stitches. Pt stated If you wiggle finger over stitches there are big chunks of blood. Looks like a big huge scab. Pt is wondering if all this is normal. [phone].

## 2020-10-05 ENCOUNTER — TELEPHONE (OUTPATIENT)
Dept: NEUROSURGERY | Age: 50
End: 2020-10-05

## 2020-10-06 ENCOUNTER — TELEPHONE (OUTPATIENT)
Dept: SURGERY | Age: 50
End: 2020-10-06
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
## 2020-10-09 ENCOUNTER — TELEPHONE (OUTPATIENT)
Dept: SURGERY | Age: 50
End: 2020-10-09

## 2020-10-09 ENCOUNTER — OFFICE VISIT (OUTPATIENT)
Dept: SURGERY | Age: 50
End: 2020-10-09
Payer: COMMERCIAL

## 2020-10-09 VITALS
HEIGHT: 66 IN | DIASTOLIC BLOOD PRESSURE: 87 MMHG | BODY MASS INDEX: 18.8 KG/M2 | SYSTOLIC BLOOD PRESSURE: 146 MMHG | HEART RATE: 98 BPM | WEIGHT: 117 LBS | OXYGEN SATURATION: 100 %

## 2020-10-09 PROCEDURE — 1111F DSCHRG MED/CURRENT MED MERGE: CPT | Performed by: NURSE PRACTITIONER

## 2020-10-09 PROCEDURE — 3017F COLORECTAL CA SCREEN DOC REV: CPT | Performed by: NURSE PRACTITIONER

## 2020-10-09 PROCEDURE — 99212 OFFICE O/P EST SF 10 MIN: CPT | Performed by: NURSE PRACTITIONER

## 2020-10-09 PROCEDURE — 99213 OFFICE O/P EST LOW 20 MIN: CPT | Performed by: NURSE PRACTITIONER

## 2020-10-09 PROCEDURE — G8427 DOCREV CUR MEDS BY ELIG CLIN: HCPCS | Performed by: NURSE PRACTITIONER

## 2020-10-09 PROCEDURE — G8484 FLU IMMUNIZE NO ADMIN: HCPCS | Performed by: NURSE PRACTITIONER

## 2020-10-09 PROCEDURE — 1036F TOBACCO NON-USER: CPT | Performed by: NURSE PRACTITIONER

## 2020-10-09 PROCEDURE — G8420 CALC BMI NORM PARAMETERS: HCPCS | Performed by: NURSE PRACTITIONER

## 2020-10-09 NOTE — PROGRESS NOTES
MA was informed by Radiology check-in patient refused CT scan left without completing scan.   Electronically signed by Shannon Davidson on 10/9/20 at 4:06 PM EDT

## 2020-10-09 NOTE — TELEPHONE ENCOUNTER
Notified by CT that after she was registered, patient left before her CT was completed. Attempted to call patient, but no answer. Left message, will attempt again.     Electronically signed by MONIKA Dunne CNP on 10/9/2020 at 4:10 PM
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
## 2020-10-09 NOTE — PROGRESS NOTES
MA spoke with Kristin Layton at [address] [facility]. Scheduled patient for STAT, HOLD & CALL CT head w/ IV contrast. Patient escorted to CT department by Belinda Garcia.   Electronically signed by Valarie Claudio on 10/9/20 at 2:29 PM EDT

## 2020-10-23 ENCOUNTER — HOSPITAL ENCOUNTER (OUTPATIENT)
Dept: CT IMAGING | Age: 50
Discharge: HOME | End: 2020-10-25
Payer: COMMERCIAL

## 2020-10-23 ENCOUNTER — OFFICE VISIT (OUTPATIENT)
Dept: NEUROSURGERY | Age: 50
End: 2020-10-23
Payer: COMMERCIAL

## 2020-10-23 VITALS
DIASTOLIC BLOOD PRESSURE: 77 MMHG | HEART RATE: 89 BPM | BODY MASS INDEX: 21.16 KG/M2 | SYSTOLIC BLOOD PRESSURE: 125 MMHG | TEMPERATURE: 99.7 F | WEIGHT: 115 LBS | HEIGHT: 62 IN

## 2020-10-23 PROCEDURE — G8427 DOCREV CUR MEDS BY ELIG CLIN: HCPCS | Performed by: PHYSICIAN ASSISTANT

## 2020-10-23 PROCEDURE — 3017F COLORECTAL CA SCREEN DOC REV: CPT | Performed by: PHYSICIAN ASSISTANT

## 2020-10-23 PROCEDURE — 70450 CT HEAD/BRAIN W/O DYE: CPT

## 2020-10-23 PROCEDURE — G8420 CALC BMI NORM PARAMETERS: HCPCS | Performed by: PHYSICIAN ASSISTANT

## 2020-10-23 PROCEDURE — G8484 FLU IMMUNIZE NO ADMIN: HCPCS | Performed by: PHYSICIAN ASSISTANT

## 2020-10-23 PROCEDURE — 1036F TOBACCO NON-USER: CPT | Performed by: PHYSICIAN ASSISTANT

## 2020-10-23 PROCEDURE — 99213 OFFICE O/P EST LOW 20 MIN: CPT | Performed by: PHYSICIAN ASSISTANT

## 2020-10-23 NOTE — PROGRESS NOTES
Hospital Follow-up    Subjective: Tash Sosa is a 48 y.o.  female who initially presented to the hospital 9/18 after falling down the stairs. She was found to have a right subdural hematoma and skull fracture. Pt presents to the office today for a one month follow up. Pt states she has been doing better. Admits to intermittent headaches. Denies N/V, seizures, weakness, numbness, or tingling. Head CT scan reviewed.      Physical Exam:              WDWN, no apparent distress              Non-labored breathing               Vitals Stable              Alert and oriented x3              CN 3-12 intact              PERRL              EOMI              BORRERO well              Motor strength symmetric              Sensation to LT intact bilaterally   Right scalp wound noted.                Imaging: 10/23/20 head CT:   Impression    No acute CVA, intracranial bleed, or brain mass.         Interval resolution of right parietal subdural hematoma with stable right    parietal skull fracture           Assessment: This is a 48 y.o.  female presenting for a one month follow-up s/p right SDH and skull fx. Stable.      Plan:  -Resolution of previous SDH. Skull fracture will heal in time, no treatment needed  -OARRS report reviewed  -Follow-up in neurosurgery clinic PRN  -Call or return to neurosurgery office sooner if symptoms worsen or if new issues arise in the interim.     Electronically signed by Naveen Hannah PA-C on 10/23/2020 at 2:04 PM

## 2021-11-05 ENCOUNTER — APPOINTMENT (OUTPATIENT)
Dept: GENERAL RADIOLOGY | Age: 51
DRG: 420 | End: 2021-11-05
Payer: COMMERCIAL

## 2021-11-05 PROCEDURE — 71045 X-RAY EXAM CHEST 1 VIEW: CPT

## 2021-11-05 PROCEDURE — 99284 EMERGENCY DEPT VISIT MOD MDM: CPT

## 2021-11-05 PROCEDURE — 96365 THER/PROPH/DIAG IV INF INIT: CPT

## 2021-11-05 RX ORDER — 0.9 % SODIUM CHLORIDE 0.9 %
1000 INTRAVENOUS SOLUTION INTRAVENOUS ONCE
Status: COMPLETED | OUTPATIENT
Start: 2021-11-05 | End: 2021-11-06

## 2021-11-06 ENCOUNTER — HOSPITAL ENCOUNTER (INPATIENT)
Age: 51
LOS: 5 days | Discharge: HOME | DRG: 420 | End: 2021-11-11
Attending: EMERGENCY MEDICINE
Payer: COMMERCIAL

## 2021-11-06 ENCOUNTER — APPOINTMENT (OUTPATIENT)
Dept: CT IMAGING | Age: 51
DRG: 420 | End: 2021-11-06
Payer: COMMERCIAL

## 2021-11-06 DIAGNOSIS — U07.1 COVID-19 VIRUS INFECTION: Primary | ICD-10-CM

## 2021-11-06 DIAGNOSIS — E10.10 DIABETIC KETOACIDOSIS WITHOUT COMA ASSOCIATED WITH TYPE 1 DIABETES MELLITUS (HCC): ICD-10-CM

## 2021-11-06 PROBLEM — N17.9 AKI (ACUTE KIDNEY INJURY) (HCC): Status: ACTIVE | Noted: 2021-11-06

## 2021-11-06 PROBLEM — E11.10 DKA, TYPE 2, NOT AT GOAL (HCC): Status: ACTIVE | Noted: 2021-11-06

## 2021-11-06 LAB
AADO2: ABNORMAL MMHG
ACETAMINOPHEN LEVEL: <5 MCG/ML (ref 10–30)
ADENOVIRUS BY PCR: NOT DETECTED
ALBUMIN SERPL-MCNC: 3.9 G/DL (ref 3.5–5.2)
ALP BLD-CCNC: 154 U/L (ref 35–104)
ALT SERPL-CCNC: 46 U/L (ref 0–32)
AMORPHOUS: ABNORMAL
AMPHETAMINE SCREEN, URINE: NOT DETECTED
ANION GAP SERPL CALCULATED.3IONS-SCNC: 13 MMOL/L (ref 7–16)
ANION GAP SERPL CALCULATED.3IONS-SCNC: 13 MMOL/L (ref 7–16)
ANION GAP SERPL CALCULATED.3IONS-SCNC: 17 MMOL/L (ref 7–16)
ANION GAP SERPL CALCULATED.3IONS-SCNC: 25 MMOL/L (ref 7–16)
ANION GAP SERPL CALCULATED.3IONS-SCNC: 32 MMOL/L (ref 7–16)
ANION GAP SERPL CALCULATED.3IONS-SCNC: 36 MMOL/L (ref 7–16)
AST SERPL-CCNC: 61 U/L (ref 0–31)
B.E.: -18.2 MMOL/L (ref -3–3)
B.E.: -31 MMOL/L (ref -3–3)
BACTERIA: ABNORMAL /HPF
BARBITURATE SCREEN URINE: NOT DETECTED
BASOPHILS ABSOLUTE: 0.25 E9/L (ref 0–0.2)
BASOPHILS RELATIVE PERCENT: 0.9 % (ref 0–2)
BENZODIAZEPINE SCREEN, URINE: NOT DETECTED
BILIRUB SERPL-MCNC: 0.2 MG/DL (ref 0–1.2)
BILIRUBIN URINE: NEGATIVE
BLOOD, URINE: ABNORMAL
BORDETELLA PARAPERTUSSIS BY PCR: NOT DETECTED
BORDETELLA PERTUSSIS BY PCR: NOT DETECTED
BUN BLDV-MCNC: 64 MG/DL (ref 6–20)
BUN BLDV-MCNC: 64 MG/DL (ref 6–20)
BUN BLDV-MCNC: 65 MG/DL (ref 6–20)
BUN BLDV-MCNC: 69 MG/DL (ref 6–20)
BUN BLDV-MCNC: 72 MG/DL (ref 6–20)
BUN BLDV-MCNC: 72 MG/DL (ref 6–20)
BURR CELLS: ABNORMAL
CALCIUM SERPL-MCNC: 8.3 MG/DL (ref 8.6–10.2)
CALCIUM SERPL-MCNC: 8.7 MG/DL (ref 8.6–10.2)
CALCIUM SERPL-MCNC: 8.8 MG/DL (ref 8.6–10.2)
CALCIUM SERPL-MCNC: 8.9 MG/DL (ref 8.6–10.2)
CALCIUM SERPL-MCNC: 8.9 MG/DL (ref 8.6–10.2)
CALCIUM SERPL-MCNC: 9.3 MG/DL (ref 8.6–10.2)
CANNABINOID SCREEN URINE: NOT DETECTED
CHLAMYDOPHILIA PNEUMONIAE BY PCR: NOT DETECTED
CHLORIDE BLD-SCNC: 108 MMOL/L (ref 98–107)
CHLORIDE BLD-SCNC: 110 MMOL/L (ref 98–107)
CHLORIDE BLD-SCNC: 113 MMOL/L (ref 98–107)
CHLORIDE BLD-SCNC: 115 MMOL/L (ref 98–107)
CHLORIDE BLD-SCNC: 91 MMOL/L (ref 98–107)
CHLORIDE BLD-SCNC: 98 MMOL/L (ref 98–107)
CHP ED QC CHECK: NORMAL
CLARITY: CLEAR
CO2: 10 MMOL/L (ref 22–29)
CO2: 12 MMOL/L (ref 22–29)
CO2: 13 MMOL/L (ref 22–29)
CO2: 2 MMOL/L (ref 22–29)
CO2: 5 MMOL/L (ref 22–29)
CO2: 7 MMOL/L (ref 22–29)
COARSE CASTS, UA: ABNORMAL /LPF (ref 0–2)
COCAINE METABOLITE SCREEN URINE: NOT DETECTED
COHB: 0.3 % (ref 0–1.5)
COHB: 0.6 % (ref 0–1.5)
COLOR: YELLOW
CORONAVIRUS 229E BY PCR: NOT DETECTED
CORONAVIRUS HKU1 BY PCR: NOT DETECTED
CORONAVIRUS NL63 BY PCR: NOT DETECTED
CORONAVIRUS OC43 BY PCR: NOT DETECTED
CREAT SERPL-MCNC: 1.9 MG/DL (ref 0.5–1)
CREAT SERPL-MCNC: 2 MG/DL (ref 0.5–1)
CREAT SERPL-MCNC: 2.1 MG/DL (ref 0.5–1)
CREAT SERPL-MCNC: 2.1 MG/DL (ref 0.5–1)
CREAT SERPL-MCNC: 2.2 MG/DL (ref 0.5–1)
CREAT SERPL-MCNC: 2.4 MG/DL (ref 0.5–1)
CRITICAL: ABNORMAL
CRITICAL: ABNORMAL
DATE ANALYZED: ABNORMAL
DATE ANALYZED: ABNORMAL
DATE OF COLLECTION: ABNORMAL
DATE OF COLLECTION: ABNORMAL
EOSINOPHILS ABSOLUTE: 0 E9/L (ref 0.05–0.5)
EOSINOPHILS RELATIVE PERCENT: 0.1 % (ref 0–6)
EPITHELIAL CELLS, UA: ABNORMAL /HPF
ETHANOL: <10 MG/DL (ref 0–0.08)
FENTANYL SCREEN, URINE: NOT DETECTED
FIO2: 21 %
GFR AFRICAN AMERICAN: 26
GFR AFRICAN AMERICAN: 28
GFR AFRICAN AMERICAN: 30
GFR AFRICAN AMERICAN: 30
GFR AFRICAN AMERICAN: 32
GFR AFRICAN AMERICAN: 34
GFR NON-AFRICAN AMERICAN: 21 ML/MIN/1.73
GFR NON-AFRICAN AMERICAN: 23 ML/MIN/1.73
GFR NON-AFRICAN AMERICAN: 25 ML/MIN/1.73
GFR NON-AFRICAN AMERICAN: 25 ML/MIN/1.73
GFR NON-AFRICAN AMERICAN: 26 ML/MIN/1.73
GFR NON-AFRICAN AMERICAN: 28 ML/MIN/1.73
GLUCOSE BLD-MCNC: 161 MG/DL
GLUCOSE BLD-MCNC: 180 MG/DL
GLUCOSE BLD-MCNC: 182 MG/DL
GLUCOSE BLD-MCNC: 206 MG/DL (ref 74–99)
GLUCOSE BLD-MCNC: 208 MG/DL
GLUCOSE BLD-MCNC: 218 MG/DL
GLUCOSE BLD-MCNC: 251 MG/DL
GLUCOSE BLD-MCNC: 263 MG/DL (ref 74–99)
GLUCOSE BLD-MCNC: 282 MG/DL
GLUCOSE BLD-MCNC: 310 MG/DL (ref 74–99)
GLUCOSE BLD-MCNC: 351 MG/DL (ref 74–99)
GLUCOSE BLD-MCNC: 724 MG/DL (ref 74–99)
GLUCOSE BLD-MCNC: 890 MG/DL (ref 74–99)
GLUCOSE URINE: >=1000 MG/DL
HCO3: 2.4 MMOL/L (ref 22–26)
HCO3: 9.4 MMOL/L (ref 22–26)
HCT VFR BLD CALC: 47 % (ref 34–48)
HEMOGLOBIN: 14.8 G/DL (ref 11.5–15.5)
HHB: 1.3 % (ref 0–5)
HHB: 27.8 % (ref 0–5)
HUMAN METAPNEUMOVIRUS BY PCR: NOT DETECTED
HUMAN RHINOVIRUS/ENTEROVIRUS BY PCR: DETECTED
INFLUENZA A BY PCR: NOT DETECTED
INFLUENZA B BY PCR: NOT DETECTED
KETONES, URINE: 40 MG/DL
LAB: ABNORMAL
LAB: ABNORMAL
LACTIC ACID: 1.7 MMOL/L (ref 0.5–2.2)
LEUKOCYTE ESTERASE, URINE: NEGATIVE
LYMPHOCYTES ABSOLUTE: 1.1 E9/L (ref 1.5–4)
LYMPHOCYTES RELATIVE PERCENT: 4.3 % (ref 20–42)
Lab: ABNORMAL
Lab: ABNORMAL
Lab: NORMAL
MAGNESIUM: 1.8 MG/DL (ref 1.6–2.6)
MAGNESIUM: 1.9 MG/DL (ref 1.6–2.6)
MAGNESIUM: 1.9 MG/DL (ref 1.6–2.6)
MAGNESIUM: 2 MG/DL (ref 1.6–2.6)
MAGNESIUM: 2.5 MG/DL (ref 1.6–2.6)
MCH RBC QN AUTO: 28.6 PG (ref 26–35)
MCHC RBC AUTO-ENTMCNC: 31.5 % (ref 32–34.5)
MCV RBC AUTO: 90.7 FL (ref 80–99.9)
METAMYELOCYTES RELATIVE PERCENT: 4.3 % (ref 0–1)
METER GLUCOSE: 161 MG/DL (ref 74–99)
METER GLUCOSE: 180 MG/DL (ref 74–99)
METER GLUCOSE: 182 MG/DL (ref 74–99)
METER GLUCOSE: 208 MG/DL (ref 74–99)
METER GLUCOSE: 218 MG/DL (ref 74–99)
METER GLUCOSE: 251 MG/DL (ref 74–99)
METER GLUCOSE: 270 MG/DL (ref 74–99)
METER GLUCOSE: 282 MG/DL (ref 74–99)
METER GLUCOSE: 313 MG/DL (ref 74–99)
METER GLUCOSE: 326 MG/DL (ref 74–99)
METER GLUCOSE: 339 MG/DL (ref 74–99)
METER GLUCOSE: 372 MG/DL (ref 74–99)
METER GLUCOSE: 398 MG/DL (ref 74–99)
METER GLUCOSE: >500 MG/DL (ref 74–99)
METER GLUCOSE: >500 MG/DL (ref 74–99)
METHADONE SCREEN, URINE: NOT DETECTED
METHB: 0.3 % (ref 0–1.5)
METHB: 0.5 % (ref 0–1.5)
MODE: ABNORMAL
MODE: ABNORMAL
MONOCYTES ABSOLUTE: 1.1 E9/L (ref 0.1–0.95)
MONOCYTES RELATIVE PERCENT: 4.3 % (ref 2–12)
MYCOPLASMA PNEUMONIAE BY PCR: NOT DETECTED
MYELOCYTE PERCENT: 5.1 % (ref 0–0)
NEUTROPHILS ABSOLUTE: 25.12 E9/L (ref 1.8–7.3)
NEUTROPHILS RELATIVE PERCENT: 81.2 % (ref 43–80)
NITRITE, URINE: NEGATIVE
O2 CONTENT: 13.1 ML/DL
O2 CONTENT: 19.8 ML/DL
O2 SATURATION: 71.9 % (ref 92–98.5)
O2 SATURATION: 98.7 % (ref 92–98.5)
O2HB: 71.3 % (ref 94–97)
O2HB: 97.9 % (ref 94–97)
OPERATOR ID: 1868
OPERATOR ID: 1921
OPIATE SCREEN URINE: NOT DETECTED
OVALOCYTES: ABNORMAL
OXYCODONE URINE: NOT DETECTED
PARAINFLUENZA VIRUS 1 BY PCR: NOT DETECTED
PARAINFLUENZA VIRUS 2 BY PCR: NOT DETECTED
PARAINFLUENZA VIRUS 3 BY PCR: NOT DETECTED
PARAINFLUENZA VIRUS 4 BY PCR: NOT DETECTED
PATIENT TEMP: 37 C
PATIENT TEMP: 37 C
PCO2: 15.4 MMHG (ref 35–45)
PCO2: 28.2 MMHG (ref 35–45)
PDW BLD-RTO: 12.3 FL (ref 11.5–15)
PFO2: ABNORMAL MMHG/%
PH BLOOD GAS: 6.82 (ref 7.35–7.45)
PH BLOOD GAS: 7.14 (ref 7.35–7.45)
PH UA: 5.5 (ref 5–9)
PHENCYCLIDINE SCREEN URINE: NOT DETECTED
PHOSPHORUS: 0.4 MG/DL (ref 2.5–4.5)
PHOSPHORUS: 0.4 MG/DL (ref 2.5–4.5)
PHOSPHORUS: 0.6 MG/DL (ref 2.5–4.5)
PHOSPHORUS: 2.9 MG/DL (ref 2.5–4.5)
PHOSPHORUS: <0.3 MG/DL (ref 2.5–4.5)
PLATELET # BLD: 169 E9/L (ref 130–450)
PMV BLD AUTO: 13.9 FL (ref 7–12)
PO2: 165.7 MMHG (ref 75–100)
PO2: 33.7 MMHG (ref 75–100)
POIKILOCYTES: ABNORMAL
POTASSIUM SERPL-SCNC: 2.9 MMOL/L (ref 3.5–5)
POTASSIUM SERPL-SCNC: 3 MMOL/L (ref 3.5–5)
POTASSIUM SERPL-SCNC: 3.2 MMOL/L (ref 3.5–5)
POTASSIUM SERPL-SCNC: 3.2 MMOL/L (ref 3.5–5)
POTASSIUM SERPL-SCNC: 3.8 MMOL/L (ref 3.5–5)
POTASSIUM SERPL-SCNC: 4.2 MMOL/L (ref 3.5–5)
PROTEIN UA: 30 MG/DL
RBC # BLD: 5.18 E12/L (ref 3.5–5.5)
RBC UA: ABNORMAL /HPF (ref 0–2)
RESPIRATORY SYNCYTIAL VIRUS BY PCR: NOT DETECTED
SALICYLATE, SERUM: <0.3 MG/DL (ref 0–30)
SARS-COV-2, PCR: DETECTED
SODIUM BLD-SCNC: 129 MMOL/L (ref 132–146)
SODIUM BLD-SCNC: 135 MMOL/L (ref 132–146)
SODIUM BLD-SCNC: 136 MMOL/L (ref 132–146)
SODIUM BLD-SCNC: 140 MMOL/L (ref 132–146)
SOURCE, BLOOD GAS: ABNORMAL
SOURCE, BLOOD GAS: ABNORMAL
SPECIFIC GRAVITY UA: 1.02 (ref 1–1.03)
THB: 13.1 G/DL (ref 11.5–16.5)
THB: 14.2 G/DL (ref 11.5–16.5)
TIME ANALYZED: 1012
TIME ANALYZED: 159
TOTAL PROTEIN: 7.1 G/DL (ref 6.4–8.3)
TOXIC GRANULATION: ABNORMAL
TRICYCLIC ANTIDEPRESSANTS SCREEN SERUM: NEGATIVE NG/ML
TROPONIN, HIGH SENSITIVITY: 91 NG/L (ref 0–9)
UROBILINOGEN, URINE: 0.2 E.U./DL
VACUOLATED NEUTROPHILS: ABNORMAL
WBC # BLD: 27.6 E9/L (ref 4.5–11.5)
WBC UA: ABNORMAL /HPF (ref 0–5)

## 2021-11-06 PROCEDURE — 2140000000 HC CCU INTERMEDIATE R&B

## 2021-11-06 PROCEDURE — 82077 ASSAY SPEC XCP UR&BREATH IA: CPT

## 2021-11-06 PROCEDURE — 70450 CT HEAD/BRAIN W/O DYE: CPT

## 2021-11-06 PROCEDURE — 82962 GLUCOSE BLOOD TEST: CPT

## 2021-11-06 PROCEDURE — 85025 COMPLETE CBC W/AUTO DIFF WBC: CPT

## 2021-11-06 PROCEDURE — 80143 DRUG ASSAY ACETAMINOPHEN: CPT

## 2021-11-06 PROCEDURE — 80307 DRUG TEST PRSMV CHEM ANLYZR: CPT

## 2021-11-06 PROCEDURE — 36415 COLL VENOUS BLD VENIPUNCTURE: CPT

## 2021-11-06 PROCEDURE — 80179 DRUG ASSAY SALICYLATE: CPT

## 2021-11-06 PROCEDURE — 6370000000 HC RX 637 (ALT 250 FOR IP): Performed by: EMERGENCY MEDICINE

## 2021-11-06 PROCEDURE — 82805 BLOOD GASES W/O2 SATURATION: CPT

## 2021-11-06 PROCEDURE — 6360000002 HC RX W HCPCS: Performed by: EMERGENCY MEDICINE

## 2021-11-06 PROCEDURE — 2580000003 HC RX 258: Performed by: EMERGENCY MEDICINE

## 2021-11-06 PROCEDURE — 87040 BLOOD CULTURE FOR BACTERIA: CPT

## 2021-11-06 PROCEDURE — 2500000003 HC RX 250 WO HCPCS

## 2021-11-06 PROCEDURE — 80053 COMPREHEN METABOLIC PANEL: CPT

## 2021-11-06 PROCEDURE — 84100 ASSAY OF PHOSPHORUS: CPT

## 2021-11-06 PROCEDURE — 93005 ELECTROCARDIOGRAM TRACING: CPT | Performed by: EMERGENCY MEDICINE

## 2021-11-06 PROCEDURE — 83605 ASSAY OF LACTIC ACID: CPT

## 2021-11-06 PROCEDURE — 84484 ASSAY OF TROPONIN QUANT: CPT

## 2021-11-06 PROCEDURE — 83735 ASSAY OF MAGNESIUM: CPT

## 2021-11-06 PROCEDURE — 02HV33Z INSERTION OF INFUSION DEVICE INTO SUPERIOR VENA CAVA, PERCUTANEOUS APPROACH: ICD-10-PCS | Performed by: INTERNAL MEDICINE

## 2021-11-06 PROCEDURE — 74150 CT ABDOMEN W/O CONTRAST: CPT

## 2021-11-06 PROCEDURE — 2500000003 HC RX 250 WO HCPCS: Performed by: EMERGENCY MEDICINE

## 2021-11-06 PROCEDURE — 81001 URINALYSIS AUTO W/SCOPE: CPT

## 2021-11-06 PROCEDURE — 0202U NFCT DS 22 TRGT SARS-COV-2: CPT

## 2021-11-06 PROCEDURE — 80048 BASIC METABOLIC PNL TOTAL CA: CPT

## 2021-11-06 RX ORDER — DEXTROSE MONOHYDRATE 25 G/50ML
12.5 INJECTION, SOLUTION INTRAVENOUS PRN
Status: DISCONTINUED | OUTPATIENT
Start: 2021-11-06 | End: 2021-11-07 | Stop reason: SDUPTHER

## 2021-11-06 RX ORDER — POTASSIUM CHLORIDE 7.45 MG/ML
10 INJECTION INTRAVENOUS PRN
Status: DISCONTINUED | OUTPATIENT
Start: 2021-11-06 | End: 2021-11-07

## 2021-11-06 RX ORDER — MAGNESIUM SULFATE 1 G/100ML
1000 INJECTION INTRAVENOUS PRN
Status: DISCONTINUED | OUTPATIENT
Start: 2021-11-06 | End: 2021-11-07

## 2021-11-06 RX ORDER — SODIUM CHLORIDE 9 MG/ML
INJECTION, SOLUTION INTRAVENOUS CONTINUOUS
Status: DISCONTINUED | OUTPATIENT
Start: 2021-11-06 | End: 2021-11-08

## 2021-11-06 RX ORDER — DEXTROSE AND SODIUM CHLORIDE 5; .45 G/100ML; G/100ML
INJECTION, SOLUTION INTRAVENOUS CONTINUOUS PRN
Status: DISCONTINUED | OUTPATIENT
Start: 2021-11-06 | End: 2021-11-11 | Stop reason: HOSPADM

## 2021-11-06 RX ORDER — 0.9 % SODIUM CHLORIDE 0.9 %
1000 INTRAVENOUS SOLUTION INTRAVENOUS ONCE
Status: COMPLETED | OUTPATIENT
Start: 2021-11-06 | End: 2021-11-06

## 2021-11-06 RX ADMIN — SODIUM BICARBONATE 100 ML: 84 INJECTION, SOLUTION INTRAVENOUS at 03:00

## 2021-11-06 RX ADMIN — DEXTROSE AND SODIUM CHLORIDE: 5; 450 INJECTION, SOLUTION INTRAVENOUS at 10:40

## 2021-11-06 RX ADMIN — SODIUM CHLORIDE 0.1 UNITS/KG/HR: 9 INJECTION, SOLUTION INTRAVENOUS at 10:31

## 2021-11-06 RX ADMIN — POTASSIUM CHLORIDE 10 MEQ: 10 INJECTION, SOLUTION INTRAVENOUS at 05:36

## 2021-11-06 RX ADMIN — POTASSIUM CHLORIDE 10 MEQ: 10 INJECTION, SOLUTION INTRAVENOUS at 21:00

## 2021-11-06 RX ADMIN — DEXTROSE AND SODIUM CHLORIDE: 5; 450 INJECTION, SOLUTION INTRAVENOUS at 18:31

## 2021-11-06 RX ADMIN — POTASSIUM CHLORIDE 10 MEQ: 10 INJECTION, SOLUTION INTRAVENOUS at 08:04

## 2021-11-06 RX ADMIN — POTASSIUM CHLORIDE 10 MEQ: 10 INJECTION, SOLUTION INTRAVENOUS at 10:20

## 2021-11-06 RX ADMIN — SODIUM CHLORIDE 0.1 UNITS/KG/HR: 9 INJECTION, SOLUTION INTRAVENOUS at 02:23

## 2021-11-06 RX ADMIN — POTASSIUM CHLORIDE 10 MEQ: 10 INJECTION, SOLUTION INTRAVENOUS at 23:15

## 2021-11-06 RX ADMIN — POTASSIUM CHLORIDE 10 MEQ: 10 INJECTION, SOLUTION INTRAVENOUS at 20:00

## 2021-11-06 RX ADMIN — POTASSIUM CHLORIDE 10 MEQ: 10 INJECTION, SOLUTION INTRAVENOUS at 18:30

## 2021-11-06 RX ADMIN — SODIUM CHLORIDE 1000 ML: 9 INJECTION, SOLUTION INTRAVENOUS at 06:18

## 2021-11-06 RX ADMIN — SODIUM CHLORIDE: 9 INJECTION, SOLUTION INTRAVENOUS at 02:00

## 2021-11-06 RX ADMIN — SODIUM CHLORIDE 1000 ML: 9 INJECTION, SOLUTION INTRAVENOUS at 02:10

## 2021-11-06 RX ADMIN — POTASSIUM CHLORIDE 10 MEQ: 10 INJECTION, SOLUTION INTRAVENOUS at 19:16

## 2021-11-06 RX ADMIN — POTASSIUM PHOSPHATE, MONOBASIC AND POTASSIUM PHOSPHATE, DIBASIC 10 MMOL: 224; 236 INJECTION, SOLUTION, CONCENTRATE INTRAVENOUS at 13:59

## 2021-11-06 RX ADMIN — SODIUM CHLORIDE: 9 INJECTION, SOLUTION INTRAVENOUS at 04:17

## 2021-11-06 RX ADMIN — POTASSIUM CHLORIDE 10 MEQ: 10 INJECTION, SOLUTION INTRAVENOUS at 09:05

## 2021-11-06 RX ADMIN — SODIUM CHLORIDE 4.8 UNITS/HR: 9 INJECTION, SOLUTION INTRAVENOUS at 19:40

## 2021-11-06 NOTE — ED NOTES
Checked pt's glucose, will send confirmation to lab. Read high on POCT. Will adjust insulin appropriately at that time.       Scooby Cavanaugh RN  11/06/21 8834

## 2021-11-06 NOTE — ED NOTES
Per pharmacy , sodium phosphate unavailable. Request order would need to be changed to potassium phosphate.  Dr Anahi Dye notified     Amilcar Jensen RN  11/06/21 7048

## 2021-11-06 NOTE — Clinical Note
Patient Class: Inpatient [101]   REQUIRED: Diagnosis: DKA, type 2, not at goal [facility]) [[id number]]   Estimated Length of Stay: Estimated stay of more than 2 midnights   Telemetry/Cardiac Monitoring Required?: Yes

## 2021-11-06 NOTE — ED NOTES
Insulin rate checked. Insulin bag empty upon arrival. Rate set at 26.6ml/hr from previous RN at 5:25am. Rate calculated by this RN and correct rate is 2.7ml/hr. Verified with pharmacist that rate of 26.6ml/hr is incorrect. Dr. Ivonne Dakins and charge RN notified. New verbal orders for D5 1/2 NS and rate of 5.4ml/hr given. Patient . Message sent to pharmacy for new insulin bag.       Jefrfy Ramirez RN  11/06/21 7375

## 2021-11-06 NOTE — ED PROVIDER NOTES
tenderness  Abdomen: Soft. Tender lower abdomen  Non distended. +BS. No rebound, guarding, or rigidity. No pulsatile masses appreciated. Musculoskeletal: Moves all extremities x 4. Warm and well perfused, no clubbing, cyanosis, or edema. Capillary refill <3 seconds  Skin: warm and dry. No rashes. Neurologic: Is awake and alert unable to assess orientation, CN 2-12 grossly intact, no focal deficits, symmetric strength 5/5 in the upper and lower extremities bilaterally  Psych: Normal Affect    -------------------------------------------------- RESULTS -------------------------------------------------  I have personally reviewed all laboratory and imaging results for this patient. Results are listed below.      LABS:  Results for orders placed or performed during the hospital encounter of 11/06/21   Respiratory Panel, Molecular, with COVID-19 (Restricted: peds pts or suitable admitted adults)   Result Value Ref Range    Adenovirus by PCR Not Detected Not Detected    Bordetella parapertussis by PCR Not Detected Not Detected    Bordetella pertussis by PCR Not Detected Not Detected    Chlamydophilia pneumoniae by PCR Not Detected Not Detected    Coronavirus 229E by PCR Not Detected Not Detected    Coronavirus HKU1 by PCR Not Detected Not Detected    Coronavirus NL63 by PCR Not Detected Not Detected    Coronavirus OC43 by PCR Not Detected Not Detected    SARS-CoV-2, PCR DETECTED (A) Not Detected    Human Metapneumovirus by PCR Not Detected Not Detected    Human Rhinovirus/Enterovirus by PCR DETECTED (A) Not Detected    Influenza A by PCR Not Detected Not Detected    Influenza B by PCR Not Detected Not Detected    Mycoplasma pneumoniae by PCR Not Detected Not Detected    Parainfluenza Virus 1 by PCR Not Detected Not Detected    Parainfluenza Virus 2 by PCR Not Detected Not Detected    Parainfluenza Virus 3 by PCR Not Detected Not Detected    Parainfluenza Virus 4 by PCR Not Detected Not Detected    Respiratory Syncytial Virus by PCR Not Detected Not Detected   CBC auto differential   Result Value Ref Range    WBC 27.6 (H) 4.5 - 11.5 E9/L    RBC 5.18 3.50 - 5.50 E12/L    Hemoglobin 14.8 11.5 - 15.5 g/dL    Hematocrit 47.0 34.0 - 48.0 %    MCV 90.7 80.0 - 99.9 fL    MCH 28.6 26.0 - 35.0 pg    MCHC 31.5 (L) 32.0 - 34.5 %    RDW 12.3 11.5 - 15.0 fL    Platelets 498 071 - 075 E9/L    MPV 13.9 (H) 7.0 - 12.0 fL    Neutrophils % 81.2 (H) 43.0 - 80.0 %    Lymphocytes % 4.3 (L) 20.0 - 42.0 %    Monocytes % 4.3 2.0 - 12.0 %    Eosinophils % 0.1 0.0 - 6.0 %    Basophils % 0.9 0.0 - 2.0 %    Neutrophils Absolute 25.12 (H) 1.80 - 7.30 E9/L    Lymphocytes Absolute 1.10 (L) 1.50 - 4.00 E9/L    Monocytes Absolute 1.10 (H) 0.10 - 0.95 E9/L    Eosinophils Absolute 0.00 (L) 0.05 - 0.50 E9/L    Basophils Absolute 0.25 (H) 0.00 - 0.20 E9/L    Metamyelocytes Relative 4.3 (H) 0.0 - 1.0 %    Myelocyte Percent 5.1 0 - 0 %    Toxic Granulation 2+     Vacuolated Neutrophils 2+     Poikilocytes 1+     Efife Cells 1+     Ovalocytes 1+    Comprehensive Metabolic Panel   Result Value Ref Range    Sodium 129 (L) 132 - 146 mmol/L    Potassium 3.8 3.5 - 5.0 mmol/L    Chloride 91 (L) 98 - 107 mmol/L    CO2 2 (LL) 22 - 29 mmol/L    Anion Gap 36 (H) 7 - 16 mmol/L    Glucose 890 (HH) 74 - 99 mg/dL    BUN 64 (H) 6 - 20 mg/dL    CREATININE 2.1 (H) 0.5 - 1.0 mg/dL    GFR Non-African American 25 >=60 mL/min/1.73    GFR African American 30     Calcium 9.3 8.6 - 10.2 mg/dL    Total Protein 7.1 6.4 - 8.3 g/dL    Albumin 3.9 3.5 - 5.2 g/dL    Total Bilirubin 0.2 0.0 - 1.2 mg/dL    Alkaline Phosphatase 154 (H) 35 - 104 U/L    ALT 46 (H) 0 - 32 U/L    AST 61 (H) 0 - 31 U/L   Troponin   Result Value Ref Range    Troponin, High Sensitivity 91 (H) 0 - 9 ng/L   Urinalysis   Result Value Ref Range    Color, UA Yellow Straw/Yellow    Clarity, UA Clear Clear    Glucose, Ur >=1000 (A) Negative mg/dL    Bilirubin Urine Negative Negative    Ketones, Urine 40 (A) Negative mg/dL Specific Gravity, UA 1.025 1.005 - 1.030    Blood, Urine MODERATE (A) Negative    pH, UA 5.5 5.0 - 9.0    Protein, UA 30 (A) Negative mg/dL    Urobilinogen, Urine 0.2 <2.0 E.U./dL    Nitrite, Urine Negative Negative    Leukocyte Esterase, Urine Negative Negative   Lactic Acid, Plasma   Result Value Ref Range    Lactic Acid 1.7 0.5 - 2.2 mmol/L   Urine Drug Screen   Result Value Ref Range    Amphetamine Screen, Urine NOT DETECTED Negative <1000 ng/mL    Barbiturate Screen, Ur NOT DETECTED Negative < 200 ng/mL    Benzodiazepine Screen, Urine NOT DETECTED Negative < 200 ng/mL    Cannabinoid Scrn, Ur NOT DETECTED Negative < 50ng/mL    Cocaine Metabolite Screen, Urine NOT DETECTED Negative < 300 ng/mL    Opiate Scrn, Ur NOT DETECTED Negative < 300ng/mL    PCP Screen, Urine NOT DETECTED Negative < 25 ng/mL    Methadone Screen, Urine NOT DETECTED Negative <300 ng/mL    Oxycodone Urine NOT DETECTED Negative <100 ng/mL    FENTANYL SCREEN, URINE NOT DETECTED Negative <1 ng/mL    Drug Screen Comment: see below    Serum Drug Screen   Result Value Ref Range    Ethanol Lvl <10 mg/dL    Acetaminophen Level <5.0 (L) 10.0 - 24.2 mcg/mL    Salicylate, Serum <1.6 0.0 - 30.0 mg/dL    TCA Scrn NEGATIVE Cutoff:300 ng/mL   Basic Metabolic Panel   Result Value Ref Range    Sodium 135 132 - 146 mmol/L    Potassium 3.2 (L) 3.5 - 5.0 mmol/L    Chloride 98 98 - 107 mmol/L    CO2 5 (LL) 22 - 29 mmol/L    Anion Gap 32 (H) 7 - 16 mmol/L    Glucose 724 (HH) 74 - 99 mg/dL    BUN 64 (H) 6 - 20 mg/dL    CREATININE 2.0 (H) 0.5 - 1.0 mg/dL    GFR Non-African American 26 >=60 mL/min/1.73    GFR African American 32     Calcium 8.3 (L) 8.6 - 10.2 mg/dL   Magnesium   Result Value Ref Range    Magnesium 2.5 1.6 - 2.6 mg/dL   Phosphorus   Result Value Ref Range    Phosphorus 2.9 2.5 - 4.5 mg/dL   Blood Gas, Arterial   Result Value Ref Range    Date Analyzed [id number]     Time Analyzed 0159     Source: Blood Arterial     pH, Blood Gas 6.817 (LL) 7.350 - 7.450 PCO2 15.4 (LL) 35.0 - 45.0 mmHg    PO2 165.7 (H) 75.0 - 100.0 mmHg    HCO3 2.4 (L) 22.0 - 26.0 mmol/L    B.E. -31.0 (L) -3.0 - 3.0 mmol/L    O2 Sat 98.7 (H) 92.0 - 98.5 %    PO2/FIO2 (>) mmHg/%    AaDO2 (<) mmHg    O2Hb 97.9 (H) 94.0 - 97.0 %    COHb 0.3 0.0 - 1.5 %    MetHb 0.5 0.0 - 1.5 %    O2 Content 19.8 mL/dL    HHb 1.3 0.0 - 5.0 %    tHb (est) 14.2 11.5 - 16.5 g/dL    Mode RA     FIO2 21.0 %    Date Of Collection      Time Collected      Pt Temp 37.0 C     ID 1921     Lab [id number]     Critical(s) Notified Handed report to Dr/RN    Microscopic Urinalysis   Result Value Ref Range    Coarse Casts, UA 6-10 (A) 0 - 2 /LPF    WBC, UA 1-3 0 - 5 /HPF    RBC, UA 5-10 (A) 0 - 2 /HPF    Epithelial Cells, UA MODERATE /HPF    Bacteria, UA MODERATE (A) None Seen /HPF    Amorphous, UA MODERATE    POCT Glucose   Result Value Ref Range    Meter Glucose >500 (H) 74 - 99 mg/dL       RADIOLOGY:  Interpreted by Radiologist.  CT HEAD WO CONTRAST   Final Result   No acute intracranial abnormality. MRI would be useful if symptoms persist.         XR CHEST PORTABLE   Final Result   No acute cardiopulmonary findings. PA and lateral views would be useful for   further assessment, if symptoms persist.         CT ABDOMEN WO CONTRAST Additional Contrast? None    (Results Pending)     EKG: This EKG is signed and interpreted by me. Rate: 69  Rhythm: Sinus  Interpretation: non-specific EKG  Comparison: none        ------------------------- NURSING NOTES AND VITALS REVIEWED ---------------------------   The nursing notes within the ED encounter and vital signs as below have been reviewed by myself. BP (!) 118/54   Pulse 78   Temp 95.7 °F (35.4 °C) (Axillary)   Resp 27   Ht 5' 2\" (1.575 m)   Wt 120 lb (54.4 kg)   SpO2 100%   BMI 21.95 kg/m²   Oxygen Saturation Interpretation: Normal    The patients available past medical records and past encounters were reviewed.         ------------------------------ ED COURSE/MEDICAL DECISION MAKING----------------------  Medications   0.9 % sodium chloride bolus (1,000 mLs IntraVENous New Bag 11/6/21 0618)   dextrose 50 % IV solution (has no administration in time range)   potassium chloride 10 mEq/100 mL IVPB (Peripheral Line) (10 mEq IntraVENous New Bag 11/6/21 0536)   magnesium sulfate 1000 mg in dextrose 5% 100 mL IVPB (has no administration in time range)   sodium phosphate 10 mmol in dextrose 5 % 250 mL IVPB (has no administration in time range)     Or   sodium phosphate 15 mmol in dextrose 5 % 250 mL IVPB (has no administration in time range)     Or   sodium phosphate 20 mmol in dextrose 5 % 500 mL IVPB (has no administration in time range)   0.9 % sodium chloride infusion ( IntraVENous New Bag 11/6/21 0417)   dextrose 5 % and 0.45 % sodium chloride infusion (has no administration in time range)   insulin regular (HUMULIN R;NOVOLIN R) 100 Units in sodium chloride 0.9 % 100 mL infusion (26.56 Units/hr IntraVENous Rate/Dose Change 11/6/21 0525)   0.9 % sodium chloride bolus (0 mLs IntraVENous Stopped 11/6/21 0529)   sodium bicarbonate 8.4 % injection (100 mLs  Given 11/6/21 0300)       PROCEDURE  11/6/21       Time: 605 am    CENTRAL LINE INSERTION  Risks, benefits and alternatives (for applicable procedures below) described. Performed By: Elbert Winslow MD.    Indication: centrally administered medications. Informed consent: Consent unable to be obtained due to the emergent nature of this procedure. .  Procedure: After routine sterile preparation, local anesthesia obtained by infiltration using 1% Lidocaine without epinephrine. A right 3-Lumen 7F Central Venous Catheter was placed by femoral vein approach and secured by standard fashion. Ultrasound Guidance:   not used. Number of Attempts: 1  Post-procedure Findings: A post procedural chest x-ray  was not indicated. Patient tolerated the procedure well.            Medical Decision Making:   Patient presenting here because of altered mental status. Patient was not feeling well for the last 12 hours. Patient does have history of diabetes she does have an insulin pump. Patient family was concerned about Covid. Patient unable to give history. There is some noted greenish material on her tongue. Patient labs noted reviewed. Patient noted to be in DKA. Patient was started on insulin drip as well as given IV fluids. Patient's pressure running in the 80s at times here but currently in the 90s. Central line was placed due to hypotension as well as possible use of IV pressors. Patient also ordered IV potassium here in the emergency department. I did speak to her emergency contact. They are made aware of findings and plan for admission. Re-Evaluations:             Re-evaluation. Patients symptoms show no change  Patient reeval multiple times here in the emergency department slight more responsive. Patient requesting ice. Patient vital signs noted. Patient bolused with IV fluids here in the emergency department. Patient also ordered IV insulin. Patient also ordered IV potassium. Patient also ordered IV bicarb. Repeat ABG ordered  Consultations:               I did speak to internal medicine. Patient will be admitted to ICU I also spoke to the intensivist.   Critical Care:     Please note that the withdrawal or failure to initiate urgent interventions for this patient would likely result in a life threatening deterioration or permanent disability. Accordingly this patient received 35 minutes of critical care time, excluding separately billable procedures. This patient's ED course included: a personal history and physicial eaxmination    This patient has been closely monitored during their ED course. Counseling: The emergency provider has spoken with the  and discussed todays results, in addition to providing specific details for the plan of care and counseling regarding the diagnosis and prognosis. Questions are answered at this time and they are agreeable with the plan.       --------------------------------- IMPRESSION AND DISPOSITION ---------------------------------    IMPRESSION  1. COVID-19 virus infection    2. Diabetic ketoacidosis without coma associated with type 1 diabetes mellitus ([facility]        DISPOSITION  Disposition: Admit to ICU  Patient condition is fair        NOTE: This report was transcribed using voice recognition software.  Every effort was made to ensure accuracy; however, inadvertent computerized transcription errors may be present          Axel Tyson MD  11/06/21 Anai Vuong MD  11/06/21 9780

## 2021-11-07 LAB
ANION GAP SERPL CALCULATED.3IONS-SCNC: 12 MMOL/L (ref 7–16)
APTT: 20.8 SEC (ref 24.5–35.1)
BASOPHILS ABSOLUTE: 0 E9/L (ref 0–0.2)
BASOPHILS RELATIVE PERCENT: 0 % (ref 0–2)
BETA-HYDROXYBUTYRATE: 1.7 MMOL/L (ref 0.02–0.27)
BUN BLDV-MCNC: 74 MG/DL (ref 6–20)
BURR CELLS: ABNORMAL
CALCIUM SERPL-MCNC: 9.2 MG/DL (ref 8.6–10.2)
CHLORIDE BLD-SCNC: 109 MMOL/L (ref 98–107)
CO2: 12 MMOL/L (ref 22–29)
CREAT SERPL-MCNC: 2.6 MG/DL (ref 0.5–1)
EKG ATRIAL RATE: 69 BPM
EKG P AXIS: 81 DEGREES
EKG P-R INTERVAL: 168 MS
EKG Q-T INTERVAL: 480 MS
EKG QRS DURATION: 150 MS
EKG QTC CALCULATION (BAZETT): 514 MS
EKG R AXIS: 70 DEGREES
EKG T AXIS: -86 DEGREES
EKG VENTRICULAR RATE: 69 BPM
EOSINOPHILS ABSOLUTE: 0 E9/L (ref 0.05–0.5)
EOSINOPHILS RELATIVE PERCENT: 0 % (ref 0–6)
GFR AFRICAN AMERICAN: 23
GFR NON-AFRICAN AMERICAN: 19 ML/MIN/1.73
GLUCOSE BLD-MCNC: 156 MG/DL (ref 74–99)
HCT VFR BLD CALC: 33.2 % (ref 34–48)
HEMOGLOBIN: 11.5 G/DL (ref 11.5–15.5)
INR BLD: 1.3
LACTIC ACID: 1.2 MMOL/L (ref 0.5–2.2)
LYMPHOCYTES ABSOLUTE: 0.2 E9/L (ref 1.5–4)
LYMPHOCYTES RELATIVE PERCENT: 1.7 % (ref 20–42)
MAGNESIUM: 1.7 MG/DL (ref 1.6–2.6)
MAGNESIUM: 1.8 MG/DL (ref 1.6–2.6)
MAGNESIUM: 1.8 MG/DL (ref 1.6–2.6)
MCH RBC QN AUTO: 28.3 PG (ref 26–35)
MCHC RBC AUTO-ENTMCNC: 34.6 % (ref 32–34.5)
MCV RBC AUTO: 81.6 FL (ref 80–99.9)
METER GLUCOSE: 100 MG/DL (ref 74–99)
METER GLUCOSE: 132 MG/DL (ref 74–99)
METER GLUCOSE: 145 MG/DL (ref 74–99)
METER GLUCOSE: 154 MG/DL (ref 74–99)
METER GLUCOSE: 203 MG/DL (ref 74–99)
METER GLUCOSE: 215 MG/DL (ref 74–99)
METER GLUCOSE: 360 MG/DL (ref 74–99)
METER GLUCOSE: 427 MG/DL (ref 74–99)
MONOCYTES ABSOLUTE: 0.3 E9/L (ref 0.1–0.95)
MONOCYTES RELATIVE PERCENT: 2.6 % (ref 2–12)
NEUTROPHILS ABSOLUTE: 9.5 E9/L (ref 1.8–7.3)
NEUTROPHILS RELATIVE PERCENT: 95.7 % (ref 43–80)
NUCLEATED RED BLOOD CELLS: 0.9 /100 WBC
OVALOCYTES: ABNORMAL
PDW BLD-RTO: 13.1 FL (ref 11.5–15)
PHOSPHORUS: 0.4 MG/DL (ref 2.5–4.5)
PHOSPHORUS: 0.6 MG/DL (ref 2.5–4.5)
PHOSPHORUS: 2.7 MG/DL (ref 2.5–4.5)
PLATELET # BLD: 118 E9/L (ref 130–450)
PMV BLD AUTO: 12.5 FL (ref 7–12)
POIKILOCYTES: ABNORMAL
POTASSIUM SERPL-SCNC: 4.8 MMOL/L (ref 3.5–5)
PROTHROMBIN TIME: 13.6 SEC (ref 9.3–12.4)
RBC # BLD: 4.07 E12/L (ref 3.5–5.5)
SODIUM BLD-SCNC: 133 MMOL/L (ref 132–146)
WBC # BLD: 9.9 E9/L (ref 4.5–11.5)

## 2021-11-07 PROCEDURE — 84100 ASSAY OF PHOSPHORUS: CPT

## 2021-11-07 PROCEDURE — 82010 KETONE BODYS QUAN: CPT

## 2021-11-07 PROCEDURE — 83735 ASSAY OF MAGNESIUM: CPT

## 2021-11-07 PROCEDURE — 2580000003 HC RX 258: Performed by: INTERNAL MEDICINE

## 2021-11-07 PROCEDURE — 85025 COMPLETE CBC W/AUTO DIFF WBC: CPT

## 2021-11-07 PROCEDURE — 85610 PROTHROMBIN TIME: CPT

## 2021-11-07 PROCEDURE — 2580000003 HC RX 258: Performed by: PHYSICIAN ASSISTANT

## 2021-11-07 PROCEDURE — 6370000000 HC RX 637 (ALT 250 FOR IP): Performed by: INTERNAL MEDICINE

## 2021-11-07 PROCEDURE — 83605 ASSAY OF LACTIC ACID: CPT

## 2021-11-07 PROCEDURE — 2500000003 HC RX 250 WO HCPCS: Performed by: INTERNAL MEDICINE

## 2021-11-07 PROCEDURE — 80048 BASIC METABOLIC PNL TOTAL CA: CPT

## 2021-11-07 PROCEDURE — 2580000003 HC RX 258: Performed by: EMERGENCY MEDICINE

## 2021-11-07 PROCEDURE — 2140000000 HC CCU INTERMEDIATE R&B

## 2021-11-07 PROCEDURE — 85730 THROMBOPLASTIN TIME PARTIAL: CPT

## 2021-11-07 PROCEDURE — 6360000002 HC RX W HCPCS: Performed by: PHYSICIAN ASSISTANT

## 2021-11-07 PROCEDURE — 82962 GLUCOSE BLOOD TEST: CPT

## 2021-11-07 PROCEDURE — 36415 COLL VENOUS BLD VENIPUNCTURE: CPT

## 2021-11-07 PROCEDURE — 6360000002 HC RX W HCPCS: Performed by: EMERGENCY MEDICINE

## 2021-11-07 PROCEDURE — 02HV33Z INSERTION OF INFUSION DEVICE INTO SUPERIOR VENA CAVA, PERCUTANEOUS APPROACH: ICD-10-PCS | Performed by: INTERNAL MEDICINE

## 2021-11-07 RX ORDER — DEXTROSE MONOHYDRATE 25 G/50ML
12.5 INJECTION, SOLUTION INTRAVENOUS PRN
Status: DISCONTINUED | OUTPATIENT
Start: 2021-11-07 | End: 2021-11-11 | Stop reason: HOSPADM

## 2021-11-07 RX ORDER — SODIUM CHLORIDE 0.9 % (FLUSH) 0.9 %
5-40 SYRINGE (ML) INJECTION PRN
Status: DISCONTINUED | OUTPATIENT
Start: 2021-11-07 | End: 2021-11-11 | Stop reason: HOSPADM

## 2021-11-07 RX ORDER — INSULIN GLARGINE 100 [IU]/ML
14 INJECTION, SOLUTION SUBCUTANEOUS 2 TIMES DAILY
Status: DISCONTINUED | OUTPATIENT
Start: 2021-11-07 | End: 2021-11-10

## 2021-11-07 RX ORDER — ACETAMINOPHEN 650 MG/1
650 SUPPOSITORY RECTAL EVERY 6 HOURS PRN
Status: DISCONTINUED | OUTPATIENT
Start: 2021-11-07 | End: 2021-11-11 | Stop reason: HOSPADM

## 2021-11-07 RX ORDER — SODIUM CHLORIDE 9 MG/ML
25 INJECTION, SOLUTION INTRAVENOUS PRN
Status: DISCONTINUED | OUTPATIENT
Start: 2021-11-07 | End: 2021-11-11 | Stop reason: HOSPADM

## 2021-11-07 RX ORDER — SODIUM CHLORIDE 0.9 % (FLUSH) 0.9 %
5-40 SYRINGE (ML) INJECTION EVERY 12 HOURS SCHEDULED
Status: DISCONTINUED | OUTPATIENT
Start: 2021-11-07 | End: 2021-11-11 | Stop reason: HOSPADM

## 2021-11-07 RX ORDER — HEPARIN SODIUM 10000 [USP'U]/ML
5000 INJECTION, SOLUTION INTRAVENOUS; SUBCUTANEOUS EVERY 8 HOURS
Status: DISCONTINUED | OUTPATIENT
Start: 2021-11-08 | End: 2021-11-11 | Stop reason: HOSPADM

## 2021-11-07 RX ORDER — DEXTROSE MONOHYDRATE 50 MG/ML
100 INJECTION, SOLUTION INTRAVENOUS PRN
Status: DISCONTINUED | OUTPATIENT
Start: 2021-11-07 | End: 2021-11-11 | Stop reason: HOSPADM

## 2021-11-07 RX ORDER — ACETAMINOPHEN 325 MG/1
650 TABLET ORAL EVERY 6 HOURS PRN
Status: DISCONTINUED | OUTPATIENT
Start: 2021-11-07 | End: 2021-11-11 | Stop reason: HOSPADM

## 2021-11-07 RX ORDER — ONDANSETRON 4 MG/1
4 TABLET, ORALLY DISINTEGRATING ORAL EVERY 8 HOURS PRN
Status: DISCONTINUED | OUTPATIENT
Start: 2021-11-07 | End: 2021-11-11 | Stop reason: HOSPADM

## 2021-11-07 RX ORDER — POLYETHYLENE GLYCOL 3350 17 G/17G
17 POWDER, FOR SOLUTION ORAL DAILY PRN
Status: DISCONTINUED | OUTPATIENT
Start: 2021-11-07 | End: 2021-11-11 | Stop reason: HOSPADM

## 2021-11-07 RX ORDER — ONDANSETRON 2 MG/ML
4 INJECTION INTRAMUSCULAR; INTRAVENOUS EVERY 6 HOURS PRN
Status: DISCONTINUED | OUTPATIENT
Start: 2021-11-07 | End: 2021-11-11 | Stop reason: HOSPADM

## 2021-11-07 RX ORDER — SODIUM CHLORIDE 9 MG/ML
INJECTION, SOLUTION INTRAVENOUS CONTINUOUS
Status: DISCONTINUED | OUTPATIENT
Start: 2021-11-07 | End: 2021-11-08

## 2021-11-07 RX ORDER — NICOTINE POLACRILEX 4 MG
15 LOZENGE BUCCAL PRN
Status: DISCONTINUED | OUTPATIENT
Start: 2021-11-07 | End: 2021-11-11 | Stop reason: HOSPADM

## 2021-11-07 RX ORDER — INSULIN GLARGINE 100 [IU]/ML
24 INJECTION, SOLUTION SUBCUTANEOUS NIGHTLY
Status: COMPLETED | OUTPATIENT
Start: 2021-11-07 | End: 2021-11-07

## 2021-11-07 RX ADMIN — ENOXAPARIN SODIUM 30 MG: 100 INJECTION SUBCUTANEOUS at 10:21

## 2021-11-07 RX ADMIN — Medication 10 ML: at 21:37

## 2021-11-07 RX ADMIN — SODIUM CHLORIDE: 9 INJECTION, SOLUTION INTRAVENOUS at 13:27

## 2021-11-07 RX ADMIN — INSULIN GLARGINE 14 UNITS: 100 INJECTION, SOLUTION SUBCUTANEOUS at 21:45

## 2021-11-07 RX ADMIN — INSULIN LISPRO 3 UNITS: 100 INJECTION, SOLUTION INTRAVENOUS; SUBCUTANEOUS at 21:46

## 2021-11-07 RX ADMIN — DEXTROSE AND SODIUM CHLORIDE: 5; 450 INJECTION, SOLUTION INTRAVENOUS at 01:06

## 2021-11-07 RX ADMIN — POTASSIUM PHOSPHATE, MONOBASIC AND POTASSIUM PHOSPHATE, DIBASIC 30 MMOL: 224; 236 INJECTION, SOLUTION, CONCENTRATE INTRAVENOUS at 04:42

## 2021-11-07 RX ADMIN — INSULIN GLARGINE 24 UNITS: 100 INJECTION, SOLUTION SUBCUTANEOUS at 09:09

## 2021-11-07 RX ADMIN — POTASSIUM CHLORIDE 10 MEQ: 10 INJECTION, SOLUTION INTRAVENOUS at 00:41

## 2021-11-07 NOTE — ED NOTES
Pt mother updated on pt status and POC, all questions answered.      Caprice Mayfield RN  11/06/21 2021

## 2021-11-07 NOTE — ED NOTES
Dr Nehemiah Wharton notified of critical phos of  .4 and Anion gap.   Order for replacment kphos     Lulu Blackwood RN  11/07/21 1049

## 2021-11-07 NOTE — FLOWSHEET NOTE
Anion gap closed since 11/6, still on insulin drip. Notified Intensivist Dr. Malgorzata Barba, instructed to notify PCP.

## 2021-11-07 NOTE — H&P
[address] [facility]  History and Physical      CHIEF COMPLAINT: Altered mental status, elevated blood sugar    Reason for Admission: Diabetic ketoacidosis    History Obtained From:  patient, electronic medical record    HISTORY OF PRESENT ILLNESS:      The patient is a 46 y.o. female of Ambrose Ledesma DO with significant past medical history of type 1 diabetes mellitus who presents with altered mental status for approximately 12 hours. I am seeing the patient after her anion gap had closed and she was noted to be a bit more interactive. She tells me cannot tell me why her blood sugars were so elevated, she does not report being off of her insulin pump or running out of insulin, ingesting increased carbohydrates recently. She reports abdominal pain however was not very specific about radiation, timing or duration. At the time of my visit she denied having any exposure to COVID-19. Initial evaluation in the ER, she did have respiratory panel sent and returned positive for COVID-19 and rhinovirus. Initial labs also showed decreased sodium at 129, anion gap metabolic acidosis with a normal lactic acid level, glucose elevated 890, elevated creatinine 2.1, elevated troponin and 91, elevated liver function tests with alkaline phosphatase at 154, ALT at 46, AST at 61 and a negative urinary drug screen. She was initially started on diabetic ketoacidosis protocol with insulin drip and on 11/7/2021 she had noted to have a closed anion gap, was bridged with 24 units of Lantus and started on a meal.  She did report that she is on a very specific diet and most likely not be able to eat most of the food provided here and was requesting that her daughter bring her organic and home-cooked food. I did let her know this would be allowed however we would have to have dietary or nursing improve this when the food arrives. She did comply.       All labs personally reviewed   All imaging personally reviewed Past Medical History:        Diagnosis Date    DM (diabetes mellitus), type 1 ([address]      Past Surgical History:        Procedure Laterality Date     SECTION           Medications Prior to Admission:    Not in a hospital admission. Allergies:  Patient has no known allergies. Social History:   TOBACCO:   reports that she has never smoked. She has never used smokeless tobacco.  ETOH:   reports previous alcohol use. MARITAL STATUS:    OCCUPATION:      Family History:   History reviewed. No pertinent family history. REVIEW OF SYSTEMS:    General ROS: Negative for fever or chills. Hematological and Lymphatic ROS: negative  Endocrine ROS: negative  Respiratory ROS: no cough, shortness of breath, or wheezing  Cardiovascular ROS: no chest pain or dyspnea on exertion  Gastrointestinal ROS: She does endorse abdominal pain, however declines change in bowel habits, or black or bloody stools  Genito-Urinary ROS: no dysuria, trouble voiding, or hematuria  Neurological ROS: no TIA or stroke symptoms  negative    Vitals:  /71   Pulse 92   Temp 98.4 °F (36.9 °C)   Resp 18   Ht 5' 2\" (1.575 m)   Wt 120 lb (54.4 kg)   SpO2 100%   BMI 21.95 kg/m²     PHYSICAL EXAM:  General Appearance: Well-developed, no acute distress. Head/face:  NCAT  Eyes:  No gross abnormalities. Lungs: Adequate effort, adequate air entry bilaterally  Heart:  Heart sounds are normal.  Regular rate and rhythm without murmur, gallop or rub. Abdomen: Mild tenderness to palpation epigastric area, no rebound, no guarding, no distention, positive bowel sounds. Extremities: No edema noted  Neurologic: No focal neurological deficits.     DATA:     Recent Labs     21  0035 21  0435   WBC 27.6* 9.9   HGB 14.8 11.5    118*     Recent Labs     21  1715 21  2111 21  0147    136 133   K 3.0* 4.2 4.8   BUN 72* 72* 74*   CREATININE 2.2* 2.4* 2.6*     Recent Labs     21  0035 21  0435 PROT 7.1  --    INR  --  1.3     Recent Labs     11/06/21  0035   AST 61*   ALT 46*   ALKPHOS 154*   BILITOT 0.2     No results for input(s): BNP in the last 72 hours. No results for input(s): CKTOTAL, CKMB, CKMBINDEX, TROPONINI in the last 72 hours. ASSESSMENT:      Active Problems:    DKA, type 2, not at goal [facility])    OMI (acute kidney injury) ([address]  COVID-19 infection  Metabolic acidosis related to #1  Elevated beta hydroxybutyrate related to #1  Acute kidney injury related #1  Dehydration related to #1  Pseudohyponatremia related to hyperglycemia  Hypophosphatemiareplaced  Hypokalemiareplaced      PLAN:    Patient was initially admitted to the intensive care unit as an inpatient. However as she was boarding in the ER with minimal beds available in the ICU, her anion gap actually did close. I did order 24 units of Lantus to be of an one-time noting she does not use Lantus at home and only uses her insulin pump. I did also did start her on a meal and requested that her insulin drip be shut off 1 hour post Lantus administration and reviewed this with bedside nursing. She may resume her insulin pump and we can address with sliding scale insulin if needed. I have placed her on a diabetic diet however she did report she is unable to tolerate most of the food that the hospitalist service. She is on a specific diet with organic food and home-cooked meals. She did request her daughter to bring some of this and I told her that this would need to be cleared by charge nurse and floor nurse prior to eating however if not excessively having carbohydrates, this should be fine. We will continue to aggressively hydrate her, recheck her kidney function, electrolytes, blood counts in the morning and trend these daily. Will replenish electrolytes as needed. At the moment, she is not requiring additional oxygen, complaining of shortness of breath or cough.   It seemed from a Covid perspective, she is not in distress. We will also keep a very close eye on her respiratory status in the event she takes a turn for the worse from the COVID-19 infection that she presented with as well. DVT prophylaxis She was placed on Lovenox however with OMI she would benefit from heparin. PT OT as needed to assess rehab needs  Discharge plan to home hopefully in the next 2 to 3 days pending clinical course and work-up.     Shaina Crook MD  11/7/2021  11:53 AM

## 2021-11-08 ENCOUNTER — APPOINTMENT (OUTPATIENT)
Dept: ULTRASOUND IMAGING | Age: 51
DRG: 420 | End: 2021-11-08
Payer: COMMERCIAL

## 2021-11-08 LAB
ALBUMIN SERPL-MCNC: 2.8 G/DL (ref 3.5–5.2)
ANION GAP SERPL CALCULATED.3IONS-SCNC: 10 MMOL/L (ref 7–16)
ANION GAP SERPL CALCULATED.3IONS-SCNC: 12 MMOL/L (ref 7–16)
ANION GAP SERPL CALCULATED.3IONS-SCNC: 13 MMOL/L (ref 7–16)
ANION GAP SERPL CALCULATED.3IONS-SCNC: 13 MMOL/L (ref 7–16)
ANION GAP SERPL CALCULATED.3IONS-SCNC: 14 MMOL/L (ref 7–16)
ANISOCYTOSIS: ABNORMAL
B.E.: -4.1 MMOL/L (ref -3–3)
BASOPHILS ABSOLUTE: 0 E9/L (ref 0–0.2)
BASOPHILS RELATIVE PERCENT: 0.1 % (ref 0–2)
BUN BLDV-MCNC: 74 MG/DL (ref 6–20)
BUN BLDV-MCNC: 78 MG/DL (ref 6–20)
BUN BLDV-MCNC: 79 MG/DL (ref 6–20)
BUN BLDV-MCNC: 80 MG/DL (ref 6–20)
BUN BLDV-MCNC: 82 MG/DL (ref 6–20)
CALCIUM SERPL-MCNC: 8.4 MG/DL (ref 8.6–10.2)
CALCIUM SERPL-MCNC: 8.5 MG/DL (ref 8.6–10.2)
CALCIUM SERPL-MCNC: 8.5 MG/DL (ref 8.6–10.2)
CALCIUM SERPL-MCNC: 8.6 MG/DL (ref 8.6–10.2)
CALCIUM SERPL-MCNC: 8.7 MG/DL (ref 8.6–10.2)
CHLORIDE BLD-SCNC: 112 MMOL/L (ref 98–107)
CHLORIDE BLD-SCNC: 113 MMOL/L (ref 98–107)
CHLORIDE BLD-SCNC: 114 MMOL/L (ref 98–107)
CHLORIDE BLD-SCNC: 116 MMOL/L (ref 98–107)
CHLORIDE BLD-SCNC: 118 MMOL/L (ref 98–107)
CO2: 16 MMOL/L (ref 22–29)
CO2: 18 MMOL/L (ref 22–29)
CO2: 19 MMOL/L (ref 22–29)
CO2: 8 MMOL/L (ref 22–29)
CO2: 9 MMOL/L (ref 22–29)
COHB: 0.3 % (ref 0–1.5)
CREAT SERPL-MCNC: 3 MG/DL (ref 0.5–1)
CREAT SERPL-MCNC: 3.1 MG/DL (ref 0.5–1)
CREAT SERPL-MCNC: 3.1 MG/DL (ref 0.5–1)
CREAT SERPL-MCNC: 3.2 MG/DL (ref 0.5–1)
CREAT SERPL-MCNC: 3.3 MG/DL (ref 0.5–1)
CRITICAL: ABNORMAL
DATE ANALYZED: ABNORMAL
DATE OF COLLECTION: ABNORMAL
EOSINOPHILS ABSOLUTE: 0 E9/L (ref 0.05–0.5)
EOSINOPHILS RELATIVE PERCENT: 0 % (ref 0–6)
GFR AFRICAN AMERICAN: 18
GFR AFRICAN AMERICAN: 18
GFR AFRICAN AMERICAN: 19
GFR AFRICAN AMERICAN: 19
GFR AFRICAN AMERICAN: 20
GFR NON-AFRICAN AMERICAN: 15 ML/MIN/1.73
GFR NON-AFRICAN AMERICAN: 15 ML/MIN/1.73
GFR NON-AFRICAN AMERICAN: 16 ML/MIN/1.73
GLUCOSE BLD-MCNC: 103 MG/DL (ref 74–99)
GLUCOSE BLD-MCNC: 146 MG/DL (ref 74–99)
GLUCOSE BLD-MCNC: 164 MG/DL (ref 74–99)
GLUCOSE BLD-MCNC: 172 MG/DL (ref 74–99)
GLUCOSE BLD-MCNC: 192 MG/DL (ref 74–99)
HCO3: 17.1 MMOL/L (ref 22–26)
HCT VFR BLD CALC: 34.9 % (ref 34–48)
HEMOGLOBIN: 11.8 G/DL (ref 11.5–15.5)
HHB: 3.1 % (ref 0–5)
LAB: ABNORMAL
LYMPHOCYTES ABSOLUTE: 0.79 E9/L (ref 1.5–4)
LYMPHOCYTES RELATIVE PERCENT: 9.5 % (ref 20–42)
Lab: ABNORMAL
MAGNESIUM: 1.8 MG/DL (ref 1.6–2.6)
MCH RBC QN AUTO: 28 PG (ref 26–35)
MCHC RBC AUTO-ENTMCNC: 33.8 % (ref 32–34.5)
MCV RBC AUTO: 82.7 FL (ref 80–99.9)
METER GLUCOSE: 103 MG/DL (ref 74–99)
METER GLUCOSE: 150 MG/DL (ref 74–99)
METER GLUCOSE: 156 MG/DL (ref 74–99)
METER GLUCOSE: 177 MG/DL (ref 74–99)
METER GLUCOSE: 258 MG/DL (ref 74–99)
METHB: 0.2 % (ref 0–1.5)
MODE: ABNORMAL
MONOCYTES ABSOLUTE: 0.08 E9/L (ref 0.1–0.95)
MONOCYTES RELATIVE PERCENT: 0.9 % (ref 2–12)
NEUTROPHILS ABSOLUTE: 7.11 E9/L (ref 1.8–7.3)
NEUTROPHILS RELATIVE PERCENT: 89.7 % (ref 43–80)
NUCLEATED RED BLOOD CELLS: 0.9 /100 WBC
O2 CONTENT: 16.1 ML/DL
O2 SATURATION: 96.9 % (ref 92–98.5)
O2HB: 96.4 % (ref 94–97)
OPERATOR ID: ABNORMAL
OVALOCYTES: ABNORMAL
PATIENT TEMP: 37 C
PCO2: 21.7 MMHG (ref 35–45)
PDW BLD-RTO: 14.3 FL (ref 11.5–15)
PH BLOOD GAS: 7.51 (ref 7.35–7.45)
PLATELET # BLD: 103 E9/L (ref 130–450)
PMV BLD AUTO: 12.5 FL (ref 7–12)
PO2: 85.4 MMHG (ref 75–100)
POIKILOCYTES: ABNORMAL
POLYCHROMASIA: ABNORMAL
POTASSIUM REFLEX MAGNESIUM: 3.5 MMOL/L (ref 3.5–5)
POTASSIUM SERPL-SCNC: 3.2 MMOL/L (ref 3.5–5)
POTASSIUM SERPL-SCNC: 3.8 MMOL/L (ref 3.5–5)
POTASSIUM SERPL-SCNC: 4 MMOL/L (ref 3.5–5)
POTASSIUM SERPL-SCNC: 4.4 MMOL/L (ref 3.5–5)
RBC # BLD: 4.22 E12/L (ref 3.5–5.5)
SCHISTOCYTES: ABNORMAL
SODIUM BLD-SCNC: 138 MMOL/L (ref 132–146)
SODIUM BLD-SCNC: 140 MMOL/L (ref 132–146)
SODIUM BLD-SCNC: 141 MMOL/L (ref 132–146)
SODIUM BLD-SCNC: 142 MMOL/L (ref 132–146)
SODIUM BLD-SCNC: 144 MMOL/L (ref 132–146)
SOURCE, BLOOD GAS: ABNORMAL
THB: 11.8 G/DL (ref 11.5–16.5)
TIME ANALYZED: 1753
WBC # BLD: 7.9 E9/L (ref 4.5–11.5)

## 2021-11-08 PROCEDURE — 2580000003 HC RX 258: Performed by: PHYSICIAN ASSISTANT

## 2021-11-08 PROCEDURE — 2140000000 HC CCU INTERMEDIATE R&B

## 2021-11-08 PROCEDURE — 97161 PT EVAL LOW COMPLEX 20 MIN: CPT

## 2021-11-08 PROCEDURE — 82805 BLOOD GASES W/O2 SATURATION: CPT

## 2021-11-08 PROCEDURE — 6370000000 HC RX 637 (ALT 250 FOR IP): Performed by: INTERNAL MEDICINE

## 2021-11-08 PROCEDURE — 82040 ASSAY OF SERUM ALBUMIN: CPT

## 2021-11-08 PROCEDURE — 85025 COMPLETE CBC W/AUTO DIFF WBC: CPT

## 2021-11-08 PROCEDURE — 2580000003 HC RX 258: Performed by: INTERNAL MEDICINE

## 2021-11-08 PROCEDURE — 2500000003 HC RX 250 WO HCPCS: Performed by: INTERNAL MEDICINE

## 2021-11-08 PROCEDURE — 97530 THERAPEUTIC ACTIVITIES: CPT

## 2021-11-08 PROCEDURE — 6360000002 HC RX W HCPCS: Performed by: INTERNAL MEDICINE

## 2021-11-08 PROCEDURE — 36415 COLL VENOUS BLD VENIPUNCTURE: CPT

## 2021-11-08 PROCEDURE — 83735 ASSAY OF MAGNESIUM: CPT

## 2021-11-08 PROCEDURE — 97535 SELF CARE MNGMENT TRAINING: CPT

## 2021-11-08 PROCEDURE — 82962 GLUCOSE BLOOD TEST: CPT

## 2021-11-08 PROCEDURE — 97165 OT EVAL LOW COMPLEX 30 MIN: CPT

## 2021-11-08 PROCEDURE — 80048 BASIC METABOLIC PNL TOTAL CA: CPT

## 2021-11-08 PROCEDURE — 76770 US EXAM ABDO BACK WALL COMP: CPT

## 2021-11-08 RX ADMIN — Medication 10 ML: at 21:36

## 2021-11-08 RX ADMIN — HEPARIN SODIUM 5000 UNITS: 10000 INJECTION INTRAVENOUS; SUBCUTANEOUS at 08:42

## 2021-11-08 RX ADMIN — INSULIN LISPRO 1 UNITS: 100 INJECTION, SOLUTION INTRAVENOUS; SUBCUTANEOUS at 08:43

## 2021-11-08 RX ADMIN — INSULIN LISPRO 1 UNITS: 100 INJECTION, SOLUTION INTRAVENOUS; SUBCUTANEOUS at 17:00

## 2021-11-08 RX ADMIN — SODIUM CHLORIDE: 9 INJECTION, SOLUTION INTRAVENOUS at 05:40

## 2021-11-08 RX ADMIN — SODIUM BICARBONATE: 84 INJECTION, SOLUTION INTRAVENOUS at 10:57

## 2021-11-08 RX ADMIN — INSULIN LISPRO 1 UNITS: 100 INJECTION, SOLUTION INTRAVENOUS; SUBCUTANEOUS at 12:00

## 2021-11-08 RX ADMIN — SODIUM BICARBONATE: 84 INJECTION, SOLUTION INTRAVENOUS at 23:33

## 2021-11-08 RX ADMIN — Medication 10 ML: at 08:30

## 2021-11-08 RX ADMIN — SODIUM BICARBONATE 50 MEQ: 84 INJECTION, SOLUTION INTRAVENOUS at 15:03

## 2021-11-08 RX ADMIN — SODIUM BICARBONATE 50 MEQ: 84 INJECTION INTRAVENOUS at 10:56

## 2021-11-08 RX ADMIN — INSULIN GLARGINE 14 UNITS: 100 INJECTION, SOLUTION SUBCUTANEOUS at 22:18

## 2021-11-08 RX ADMIN — INSULIN GLARGINE 14 UNITS: 100 INJECTION, SOLUTION SUBCUTANEOUS at 08:43

## 2021-11-08 ASSESSMENT — PAIN SCALES - GENERAL: PAINLEVEL_OUTOF10: 0

## 2021-11-08 NOTE — PROGRESS NOTES
The patient is a 46 y.o. female of [facility] with significant past medical history of type 1 diabetes mellitus who presents with altered mental status for approximately 12 hours. At the time of my visit she denied having any exposure to COVID-19. Initial evaluation in the ER, she did have respiratory panel sent and returned positive for COVID-19 and rhinovirus. Initial labs also showed decreased sodium at 129, anion gap metabolic acidosis with a normal lactic acid level, glucose elevated 890, elevated creatinine 2.1, elevated troponin and 91, elevated liver function tests with alkaline phosphatase at 154, ALT at 46, AST at 61 and a negative urinary drug screen.     She was initially started on diabetic ketoacidosis protocol with insulin drip and on 11/7/2021 she had noted to have a closed anion gap, was bridged with 24 units of Lantus and started on a meal. Insulin pump not able to be used here, started on Lantus and SSI until can go back on pump at home. Subjective:  Seen today resting bed comfortably no acute distress. She was a bit lethargic however states she feels better compared to yesterday quite back to normal.  No overnight issues. Objective:    BP (!) 134/91   Pulse 56   Temp 98.2 °F (36.8 °C) (Oral)   Resp 18   Ht 5' 2\" (1.575 m)   Wt 120 lb (54.4 kg)   SpO2 99%   BMI 21.95 kg/m²     No intake/output data recorded. No intake/output data recorded. General Appearance: Well-developed, no acute distress. Head/face:  NCAT  Eyes:  No gross abnormalities. Lungs: Adequate effort, adequate air entry bilaterally  Heart:  Heart sounds are normal.    Bradycardia noted. Abdomen: Mild tenderness to palpation epigastric area, no rebound, no guarding, no distention, positive bowel sounds. Extremities: No edema noted  Neurologic: No focal neurological deficits.      Recent Labs     11/06/21  0035 11/07/21  0435 11/08/21  0624   WBC 27.6* 9.9 7.9   HGB 14.8 11.5 11.8   HCT 47.0 33.2* 34.9    118* 103*       Recent Labs     11/07/21  0147 11/08/21  0624 11/08/21  1037    138 140   K 4.8 4.4 4.0   * 116* 118*   CO2 12* 9* 8*   BUN 74* 82* 79*   CREATININE 2.6* 3.3* 3.2*   CALCIUM 9.2 8.6 8.5*       Assessment:  Active Problems:    DKA, type 2, not at goal [facility])    OMI (acute kidney injury) ([facility] [address]  COVID-19 infection  Metabolic acidosis related to #1worsening  Elevated beta hydroxybutyrate related to #1  Acute kidney injury related #1  Dehydration related to #1  Pseudohyponatremia related to hyperglycemia  Hypophosphatemiareplaced  Hypokalemiareplaced      Plan:    Patient's blood sugars are much better controlled today however is slightly elevated still. We will adjust her Lantus insulin and sliding scale insulin for tighter control. She may resume her insulin pump once discharged. Today her metabolic acidosis has worsened, I have consulted nephrology who has started her on bicarb infusion. Will defer management to them. Patient did note to me today, was not mentioned yesterday, that she did have a history of chronic kidney disease however her kidney dysfunction has worsened as well. Also appreciate nephrology's recommendation on this as well; I have increased her IV fluid rate to 150 cc an hour and encourage oral hydration. I have also requested bilateral renal ultrasound to rule out obstruction which is currently pending. We will recheck all of her labs including electrolytes, kidney function and blood counts in the morning and keep a very close eye on her glucose to ensure it is controlled and she does not go back into ketoacidosis. DVT Prophylaxis Heparin  PT/OT as needed to assess rehab needs  Discharge planning below discharge in the next 1-2 days pending clinical course and work-up from nephrology standpoint.           Jo-Ann Pyle MD  1:06 PM  11/8/2021 no

## 2021-11-08 NOTE — CONSULTS
Department of Internal Medicine  Nephrology Consult Note      Reason for Consult:  Worsening renal function, metabolic acidosis  Requesting Physician: Dr Bernal Bob: 12 hours of altered mental status    History Obtained From:  patient, electronic medical record, Quality of history:  good historian    HISTORY OF PRESENT ILLNESS:  Briefly Emmie Lynn is 46 y.o. female with history of type 1 diabetes mellitus , who was admitted on 2021 . She initially presented to the emergency department after approximately 12 hours of altered mental status. Patient denies any missing doses of her insulin or increase ingestion of carbohydrates. Initial labs were pertinent for blood glucose of 890, creatinine of 2.1 (baseline 0.8), elevated LFTs, elevated troponin and a negative urine drug screen. CT scan of the head and abdomen were unremarkable. Patient was found to be COVID-19 and rhinovirus positive. Patient was treated via DKA protocol her anion gap closed. Altered mental status resolved. We are consulted for creatinine continued to uptrend to 3.2, bicarbonate 8 and chloride 118. Denies any diarrhea.       Past Medical History:        Diagnosis Date    DM [facility]diabetes mellitus[facility]      Past Surgical History:        Procedure Laterality Date     SECTION       Current Medications:    Current Facility-Administered Medications: sodium bicarbonate 150 mEq in dextrose 5 % 1,000 mL infusion, , IntraVENous, Continuous  sodium bicarbonate 8.4 % injection 50 mEq, 50 mEq, IntraVENous, Once  sodium chloride flush 0.9 % injection 5-40 mL, 5-40 mL, IntraVENous, 2 times per day  sodium chloride flush 0.9 % injection 5-40 mL, 5-40 mL, IntraVENous, PRN  0.9 % sodium chloride infusion, 25 mL, IntraVENous, PRN  ondansetron (ZOFRAN-ODT) disintegrating tablet 4 mg, 4 mg, Oral, Q8H PRN **OR** ondansetron (ZOFRAN) injection 4 mg, 4 mg, IntraVENous, Q6H PRN  polyethylene glycol (GLYCOLAX) packet 17 g, 17 Stress : Not on file   Social Connections:     Frequency of Communication with Friends and Family: Not on file    Frequency of Social Gatherings with Friends and Family: Not on file    Attends Buddhism Services: Not on file    Active Member of Clubs or Organizations: Not on file    Attends Club or Organization Meetings: Not on file    Marital Status: Not on file   Intimate Partner Violence:     Fear of Current or Ex-Partner: Not on file    Emotionally Abused: Not on file    Physically Abused: Not on file    Sexually Abused: Not on file   Housing Stability:     Unable to Pay for Housing in the Last Year: Not on file    Number of [place] in the Last Year: Not on file    Unstable Housing in the Last Year: Not on file       Family History:   History reviewed. No pertinent family history.     REVIEW OF SYSTEMS:    CONSTITUTIONAL:  negative  EYES:  negative  HEENT:  negative  RESPIRATORY:  negative  CARDIOVASCULAR:  negative  GASTROINTESTINAL:  negative  GENITOURINARY:  negative  INTEGUMENT/BREAST:  negative  HEMATOLOGIC/LYMPHATIC:  negative  ALLERGIC/IMMUNOLOGIC:  negative  ENDOCRINE:  negative  MUSCULOSKELETAL:  negative  NEUROLOGICAL:  negative    PHYSICAL EXAM:      Vitals:    VITALS:  BP (!) 134/91   Pulse 56   Temp 98.2 °F (36.8 °C) (Oral)   Resp 18   Ht 5' 2\" (1.575 m)   Wt 120 lb (54.4 kg)   SpO2 99%   BMI 21.95 kg/m²   24HR INTAKE/OUTPUT:  No intake or output data in the 24 hours ending 11/08/21 1410    Access: Right femoral triple-lumen catheter  Constitutional: Ill-appearing, awake alert and oriented  HEENT: Unremarkable  NECK: Unremarkable  Respiratory: Lungs clear to auscultation bilaterally  Cardiovascular/Edema: Regular rate and rhythm no murmurs gallops or rubs  Gastrointestinal: Mild tenderness to palpation in the epigastric region  Musculoskeletal: Strength equal bilaterally  Neurologic: No focal neurological deficits  Other: No edema present    DATA:    CBC:   Lab Results Component Value Date    WBC 7.9 11/08/2021    RBC 4.22 11/08/2021    HGB 11.8 11/08/2021    HCT 34.9 11/08/2021    MCV 82.7 11/08/2021    MCH 28.0 11/08/2021    MCHC 33.8 11/08/2021    RDW 14.3 11/08/2021     11/08/2021    MPV 12.5 11/08/2021     CMP:    Lab Results   Component Value Date     11/08/2021    K 4.0 11/08/2021    K 4.0 09/19/2020     11/08/2021    CO2 8 11/08/2021    BUN 79 11/08/2021    CREATININE 3.2 11/08/2021    GFRAA 18 11/08/2021    LABGLOM 15 11/08/2021    GLUCOSE 146 11/08/2021    PROT 7.1 11/06/2021    LABALBU 2.8 11/08/2021    CALCIUM 8.5 11/08/2021    BILITOT 0.2 11/06/2021    ALKPHOS 154 11/06/2021    AST 61 11/06/2021    ALT 46 11/06/2021     Magnesium:    Lab Results   Component Value Date    MG 1.7 11/07/2021     Phosphorus:    Lab Results   Component Value Date    PHOS 2.7 11/07/2021       Radiology Review:      CT head without contrast 11/5/21   No acute intracranial abnormality. MRI would be useful if symptoms persist.       CT abdomen without contrast 11/5/21   1. Limited examination due to metallic artifact and paucity of   intra-abdominal fat. 2. No evidence of an acute process   3. Hepatic steatosis. 4. Limited visualization of the gallbladder and pancreas. CXR 11/5/21   No acute cardiopulmonary findings.  PA and lateral views would be useful for   further assessment, if symptoms persist.       BRIEF SUMMARY OF INITIAL CONSULTATION     Briefly Jonah Joseph is 46 y.o. female with history of type 1 diabetes mellitus , who was admitted on 11/6/2021 . She initially presented to the emergency department after approximately 12 hours of altered mental status. Patient denies any missing doses of her insulin or increase ingestion of carbohydrates. Initial labs were pertinent for blood glucose of 890, creatinine of 2.1 (baseline 0.8), elevated LFTs, elevated troponin and a negative urine drug screen. CT scan of the head and abdomen were unremarkable.  Patient was found to be COVID-19 and rhinovirus positive. Patient was treated via DKA protocol her anion gap closed. Altered mental status resolved. We are consulted for creatinine continued to uptrend to 3.2, bicarbonate 8 and chloride 118. Denies any diarrhea. Resolved issues   · To metabolic encephalopathy, multifactorial 2/2 hypoglycemia, metabolic acidosis  · Transient hypokalemia likely secondary to intracellular shifts due to hyperglycemia in the absence of insulin (resolved)  · HAGMA with elevated beta hydroxybutyrate likely secondary to diabetic ketoacidosis (resolved)    IMPRESSION/RECOMMENDATIONS:      1. OMI stage III, volume responsive OMI (urinary losses due to osmotic diuresis) versus established ATN, to r/o obstructive uropathy, creatinine continues to worsen despite fluid administration. 2. NAGMA with hyperchloremia (Cl 118) and bicarbonate (HCO3 8) secondary to urinary losses of potential bicarbonate (ketonuria) and NaCL administration. 3. HAGMA with corrected anion gap of 18.8 likely secondary to uremia (BUN 79)   4. Type 1 diabetes mellitus       PLAN:    · Obtain urine electrolytes, urine protein to creatinine ratio, urine microalbumin to creatinine ratio  · Obtain albumin level  · Obtain ABG  · BMP every 4 hours  · Retroperitoneal ultrasound ordered  · Strict I's and O's  · Romero catheter  · 1 amp of bicarb now  · Start 150 mEq bicarbonate drip at 125 cc/hour    Thank you very much Dr. Willi Velasco  for allowing us to participate in the care of Elmo Friedman.        Electronically signed by Isa Cruz MD on 11/8/2021 at 2:10 PM

## 2021-11-08 NOTE — PROGRESS NOTES
Occupational Therapy  OCCUPATIONAL THERAPY INITIAL EVALUATION     [address] [address] [facility]  [address]      [id number]                                                Patient Name: Philip Akhtar  MRN: [id number]  : 1970  Room: [facility]/6351-N    Evaluating OT: Guillermo Black OTR/L #[id number]     Referring Provider: ANISA Peter  Specific Provider Orders/Date: OT eval and treat 21    Diagnosis: DKA, type 2, not at goal [facility]) [E11.10]  Diabetic ketoacidosis without coma associated with type 1 diabetes mellitus ([facility] [address] [E10.10]  COVID-19 virus infection [U07.1]   Pt admitted to hospital with AMS     Pertinent Medical History:  has a past medical history of DM (diabetes mellitus), type 1 ([facility] [address] Precautions:  Fall Risk, Droplet plus (COVID-19), R groin triple lumen, insulin pump.  O2, continuous pulse ox    Assessment of current deficits    [x] Functional mobility  [x]ADLs  [x] Strength               []Cognition    [x] Functional transfers   [x] IADLs         [x] Safety Awareness   [x]Endurance    [] Fine Coordination              [x] Balance      [] Vision/perception   []Sensation     []Gross Motor Coordination  [] ROM  [] Delirium                   [] Motor Control     OT PLAN OF CARE   OT POC based on physician orders, patient diagnosis and results of clinical assessment    Frequency/Duration 1-3 days/wk for 2 weeks PRN   Specific OT Treatment Interventions to include:   * Instruction/training on adapted ADL techniques and AE recommendations to increase functional independence within precautions       * Training on energy conservation strategies, correct breathing pattern and techniques to improve independence/tolerance for self-care routine  * Functional transfer/mobility training/DME recommendations for increased independence, safety, and fall prevention  * Patient/Family education to increase follow through with safety techniques and functional independence  * Recommendation of environmental modifications for increased safety with functional transfers/mobility and ADLs  * Therapeutic exercise to improve motor endurance, ROM, and functional strength for ADLs/functional transfers  * Therapeutic activities to facilitate/challenge dynamic balance, stand tolerance for increased safety and independence with ADLs      Recommended Adaptive Equipment:  TBD     Home Living: Pt lives with kids in a 2 story apartment with 2 DIDIER no hand rail. Shower on 2nd floor; full flight 1 hand rail. Bed an dhalf bath on 1st    Bathroom setup: walk-in shower     Equipment owned: none    Prior Level of Function: Independent with ADLs , Independent with IADLs; ambulated without AD   Driving: yes   Occupation: enjoys walking    Pain Level: Pt reports L groin pain rated at 5/10;  Therapist facilitated repositioning to address pain      Cognition: A&O: 4/4; Follows 2 step directions   Memory:  good   Sequencing:  fair   Problem solving:  fair   Judgement/safety:  fair     Functional Assessment:  AM-PAC Daily Activity Raw Score: 19/24   Initial Eval Status  Date: 11/8/21 Treatment Status  Date: STGs = LTGs  Time frame: 10-14 days   Feeding Independent      Grooming Stand by Assist   Standing   Modified [place]    UB Dressing Stand by Assist   Modified [place]    LB Dressing Stand by Assist   Modified [place]    Bathing Stand by Assist  Modified [place]    Toileting Stand by Assist   Modified [place]    Bed Mobility  Supine to sit: Stand by Assist   Sit to supine: NT   Supine to sit: Modified [place]   Sit to supine: Modified [place]    Functional Transfers Stand by Assist   Modified [place]    Functional Mobility Contact Guard Assist     Ambulated in room including to/from bathroom without AD: + unsteadiness  Modified [place]    Balance Sitting:     Static:  sup    Dynamic:SBA  Standing: SBA     Activity Tolerance F-  F+ Visual/  Perceptual Glasses: yes  w                  Vitals: On RA  O2 sat 94-98%, HR 82 bpm    Hand Dominance left   Strength ROM Additional Info:    RUE   4/5 wfl good  and wfl FMC/dexterity noted during ADL tasks     LUE 4/5 wfl good  and wfl FMC/dexterity noted during ADL tasks     Hearing: wfl  Sensation:wfl  Tone: wfl  Edema:none noted     Comments: Upon arrival patient supine in bed and agreeable to OT Session. Therapist educated pt on role of OT. At end of session, patient seated in chair with call light and phone within reach, all lines intact. Overall patient demonstrated decreased independence and safety during completion of ADL/functional transfer/mobility tasks. Pt would benefit from continued skilled OT to increase safety and independence with completion of ADL/IADL tasks for functional independence and quality of life. Treatment: OT treatment provided this date includes: Facilitation of bed mobility, unsupported sitting balance (impacting ADLs; addressing posture, weight shifting, dynamic reaching), functional transfers (various surfaces), standing tolerance tasks (addressing posture, balance and activity tolerance while incorporating light functional reaching; impacting ADLs and functional activity) and functional ambulation tasks without AD (+ unsteadiness; including to/ from bathroom and in preparation for item retrieval tasks; cuing on posture and safety) - skilled cuing on hand placement, posture, body mechanics, energy conservation techniques and safety. Therapist facilitated self-care retraining: UB/LB self-care tasks (robe, socks), simulated toileting task and standing grooming tasks while educating pt on modified techniques, posture, safety and energy conservation techniques. Skilled monitoring of HR, O2 sats and pts response to treatment.         Rehab Potential: Good for established goals     Patient / Family Goal: return home      Patient and/or family were instructed on functional diagnosis, prognosis/goals and OT plan of care. Demonstrated fair understanding. Eval Complexity: Low    Time In: 745  Time Out: 825  Total Treatment Time: 23 minutes    Min Units   OT Eval Low 97165  x  1   OT Eval Medium 25532      OT Eval High 25474      OT Re-Eval [id number]       Therapeutic Ex 25276       Therapeutic Activities 64935  13  1   ADL/Self Care 85247  10  1   Orthotic Management 98748       Manual 23537     Neuro Re-Ed 14466       Non-Billable Time          Evaluation Time additionally includes thorough review of current medical information, gathering information on past medical history/social history and prior level of function, interpretation of standardized testing/informal observation of tasks, assessment of data and development of plan of care and goals.           Pablo Duff OTR/L #[id number]

## 2021-11-08 NOTE — PROGRESS NOTES
Physical Therapy  Physical Therapy Initial Assessment     Name: Anjana Garcia  : 1970  MRN: [id number]      Date of Service: 2021    Evaluating PT: Carmen Sparrow PT, DPT [id number]      Room #:  8019/1786-X  Diagnosis:  DKA, type 2, not at goal [facility]) [E11.10]  Diabetic ketoacidosis without coma associated with type 1 diabetes mellitus ([facility] [address] [E10.10]  COVID-19 virus infection [U07.1]  PMHx/PSHx:  DM  Precautions:  Fall risk, Droplet Plus Isolation (COVID-19), O2, continuous pulse ox, insulin pump, R groin triple lumen    SUBJECTIVE:    Pt lives with children in a 2 story aparment unit. 2 stairs and no rails to enter. Full flight of stairs and 1 rail to second floor bed and bath. Pt ambulated without AD prior to admission. OBJECTIVE:   Initial Evaluation  Date: 21 Treatment Date: Short Term/ Long Term   Goals   AM-PAC 6 Clicks 91/35     Was pt agreeable to Eval/treatment? Yes     Does pt have pain? No current complaints of pain     Bed Mobility  Rolling: NT  Supine to sit: SBA  Sit to supine: NT  Scooting: SBA to EOB  Rolling: Independent   Supine to sit: Independent   Sit to supine: Independent   Scooting: Independent    Transfers Sit to stand: SBA  Stand to sit: SBA  Stand pivot: SBA without AD  Sit to stand: Independent   Stand to sit: Independent   Stand pivot: Independent    Ambulation   25, 25, 50 feet without AD with SBA  200 feet Independent    Stair negotiation: ascended and descended NT  >12 step(s) with 1 rail(s) Mod Independent    ROM BUE: Refer to OT note  BLE: WFL     Strength BUE: Refer to OT note  BLE: WFL     Balance Sitting EOB: Supervision  Dynamic Standing: SBA without AD  Sitting EOB: Independent   Dynamic Standing: Independent      Pt is A & O x: 4 to person, place, month/year, and situation. Sensation: Pt denies numbness and tingling of extremities. Edema: Unremarkable. Patient education  Pt educated on PT role in acute care setting.     Patient response to education: Pt verbalized understanding Pt demonstrated skill Pt requires further education in this area   Yes NA No     ASSESSMENT:    Conditions Requiring Skilled Therapeutic Intervention:    [x]Decreased strength     []Decreased ROM  [x]Decreased functional mobility  [x]Decreased balance   []Decreased endurance   []Decreased posture  []Decreased sensation  []Decreased coordination   []Decreased vision  [x]Decreased safety awareness   []Increased pain       Comments:    Pt was in bed upon room entry, agreeable to PT evaluation. Pt whispered throughout session and was difficult to hear/understand at times. Pt has fair mobility but moves slowly. Pt ambulated several times within room without AD. No LOB occurred. Pt completed transfers from commode and ambulated back to chair. Pt was left in chair with all needs met at conclusion of session. Treatment:  Patient practiced and was instructed in the following treatment:     Therapeutic activities:  o Bed mobility: Pt was cued for technique and line safety during supine to sit transfer. o Transfers: Pt was cued for hand placement during sit <> stand transfers. Pt completed multiple transfers from surfaces of varying heights (EOB x1, commode x1). o Ambulation: Pt ambulated x3 reps without AD as standing balance was challenged. Pt was cued for line safety. o Vitals and symptoms were closely monitored throughout session during activity and rest breaks. Pt's/family goals:  1. To return home. Prognosis is Good for reaching above PT goals. Patient and or family understand(s) diagnosis, prognosis, and plan of care. Yes.     PHYSICAL THERAPY PLAN OF CARE:    PT POC is established based on physician order and patient diagnosis     Referring provider/PT Order:    Start   Ordering Provider    11/07/21 1345  PT evaluation and treat  Start:  11/07/21 1345,   End:  11/07/21 1345,   ONE TIME,   Standing Count:  1 Occurrences,   R         ANISA Fernando        Diagnosis:  DKA,

## 2021-11-08 NOTE — CARE COORDINATION
11/8: Transition of care:  Pt came into the ER for DKA she is a type 1 DM/Covid+11/6. CM attempted to get a hold of pt via cell and room phone. CM spoke with son Jackie Garzon and he was able to answer most of the questions. Pt resides alone in a apartment with no steps to enter. The bath/bedroom are on the 1st floor. Pt's PCP is  and uses [facility] in [facility]. Pt is a DM and has a pump and glucometer. Pt has no other DME and has no preferences for DME/HHC per son. Per son d/c plan is for his mom to return home. Pt's family can transport her home. Pt is on IV NaBicarb 150meq in D5 @ 125cc/hr, Bun 79 & Creat 3.2. SW/CM will continue to follow. Electronically signed by Diego Carnes RN on 11/8/2021 at 3:57 PM    The Plan for Transition of Care is related to the following treatment goals: DME/HHC    The Patient and/or patient representative was provided with a choice of provider and agrees   with the discharge plan. [x] Yes [] No    Freedom of choice list was provided with basic dialogue that supports the patient's individualized plan of care/goals, treatment preferences and shares the quality data associated with the providers.  [x] Yes [] No

## 2021-11-09 LAB
ANION GAP SERPL CALCULATED.3IONS-SCNC: 11 MMOL/L (ref 7–16)
ANION GAP SERPL CALCULATED.3IONS-SCNC: 12 MMOL/L (ref 7–16)
BASOPHILS ABSOLUTE: 0 E9/L (ref 0–0.2)
BASOPHILS RELATIVE PERCENT: 0.1 % (ref 0–2)
BUN BLDV-MCNC: 57 MG/DL (ref 6–20)
BUN BLDV-MCNC: 67 MG/DL (ref 6–20)
CALCIUM SERPL-MCNC: 8.1 MG/DL (ref 8.6–10.2)
CALCIUM SERPL-MCNC: 8.6 MG/DL (ref 8.6–10.2)
CHLORIDE BLD-SCNC: 108 MMOL/L (ref 98–107)
CHLORIDE BLD-SCNC: 113 MMOL/L (ref 98–107)
CHLORIDE URINE RANDOM: 66 MMOL/L
CO2: 23 MMOL/L (ref 22–29)
CO2: 24 MMOL/L (ref 22–29)
CREAT SERPL-MCNC: 2.5 MG/DL (ref 0.5–1)
CREAT SERPL-MCNC: 2.7 MG/DL (ref 0.5–1)
CREATININE URINE: 77 MG/DL (ref 29–226)
CREATININE URINE: 78 MG/DL (ref 29–226)
EOSINOPHILS ABSOLUTE: 0.07 E9/L (ref 0.05–0.5)
EOSINOPHILS RELATIVE PERCENT: 0.9 % (ref 0–6)
GFR AFRICAN AMERICAN: 22
GFR AFRICAN AMERICAN: 25
GFR NON-AFRICAN AMERICAN: 19 ML/MIN/1.73
GFR NON-AFRICAN AMERICAN: 20 ML/MIN/1.73
GLUCOSE BLD-MCNC: 50 MG/DL (ref 74–99)
GLUCOSE BLD-MCNC: 98 MG/DL (ref 74–99)
HCT VFR BLD CALC: 31.7 % (ref 34–48)
HEMOGLOBIN: 11.1 G/DL (ref 11.5–15.5)
HYPOCHROMIA: ABNORMAL
LYMPHOCYTES ABSOLUTE: 1.13 E9/L (ref 1.5–4)
LYMPHOCYTES RELATIVE PERCENT: 14.8 % (ref 20–42)
MCH RBC QN AUTO: 28.5 PG (ref 26–35)
MCHC RBC AUTO-ENTMCNC: 35 % (ref 32–34.5)
MCV RBC AUTO: 81.3 FL (ref 80–99.9)
METER GLUCOSE: 101 MG/DL (ref 74–99)
METER GLUCOSE: 132 MG/DL (ref 74–99)
METER GLUCOSE: 51 MG/DL (ref 74–99)
METER GLUCOSE: 70 MG/DL (ref 74–99)
METER GLUCOSE: 76 MG/DL (ref 74–99)
MICROALBUMIN UR-MCNC: 91.1 MG/L
MICROALBUMIN/CREAT UR-RTO: 118.3 (ref 0–30)
MONOCYTES ABSOLUTE: 0.15 E9/L (ref 0.1–0.95)
MONOCYTES RELATIVE PERCENT: 1.7 % (ref 2–12)
NEUTROPHILS ABSOLUTE: 6.23 E9/L (ref 1.8–7.3)
NEUTROPHILS RELATIVE PERCENT: 82.6 % (ref 43–80)
OVALOCYTES: ABNORMAL
PDW BLD-RTO: 14.2 FL (ref 11.5–15)
PLATELET # BLD: 97 E9/L (ref 130–450)
PLATELET CONFIRMATION: NORMAL
PMV BLD AUTO: 12.6 FL (ref 7–12)
POIKILOCYTES: ABNORMAL
POTASSIUM SERPL-SCNC: 3.2 MMOL/L (ref 3.5–5)
POTASSIUM SERPL-SCNC: 3.6 MMOL/L (ref 3.5–5)
POTASSIUM, UR: 31.6 MMOL/L
PROTEIN PROTEIN: 41 MG/DL (ref 0–12)
PROTEIN/CREAT RATIO: 0.5
PROTEIN/CREAT RATIO: 0.5 (ref 0–0.2)
RBC # BLD: 3.9 E12/L (ref 3.5–5.5)
SMUDGE CELLS: ABNORMAL
SODIUM BLD-SCNC: 144 MMOL/L (ref 132–146)
SODIUM BLD-SCNC: 147 MMOL/L (ref 132–146)
SODIUM URINE: 55 MMOL/L
TARGET CELLS: ABNORMAL
WBC # BLD: 7.5 E9/L (ref 4.5–11.5)

## 2021-11-09 PROCEDURE — 82436 ASSAY OF URINE CHLORIDE: CPT

## 2021-11-09 PROCEDURE — 6370000000 HC RX 637 (ALT 250 FOR IP): Performed by: INTERNAL MEDICINE

## 2021-11-09 PROCEDURE — 99221 1ST HOSP IP/OBS SF/LOW 40: CPT | Performed by: FAMILY MEDICINE

## 2021-11-09 PROCEDURE — 2580000003 HC RX 258: Performed by: PHYSICIAN ASSISTANT

## 2021-11-09 PROCEDURE — 2580000003 HC RX 258: Performed by: INTERNAL MEDICINE

## 2021-11-09 PROCEDURE — 82044 UR ALBUMIN SEMIQUANTITATIVE: CPT

## 2021-11-09 PROCEDURE — 80048 BASIC METABOLIC PNL TOTAL CA: CPT

## 2021-11-09 PROCEDURE — 84156 ASSAY OF PROTEIN URINE: CPT

## 2021-11-09 PROCEDURE — 6370000000 HC RX 637 (ALT 250 FOR IP): Performed by: NURSE PRACTITIONER

## 2021-11-09 PROCEDURE — 84133 ASSAY OF URINE POTASSIUM: CPT

## 2021-11-09 PROCEDURE — 2500000003 HC RX 250 WO HCPCS: Performed by: INTERNAL MEDICINE

## 2021-11-09 PROCEDURE — 84300 ASSAY OF URINE SODIUM: CPT

## 2021-11-09 PROCEDURE — 36415 COLL VENOUS BLD VENIPUNCTURE: CPT

## 2021-11-09 PROCEDURE — 82962 GLUCOSE BLOOD TEST: CPT

## 2021-11-09 PROCEDURE — 6360000002 HC RX W HCPCS: Performed by: INTERNAL MEDICINE

## 2021-11-09 PROCEDURE — 85025 COMPLETE CBC W/AUTO DIFF WBC: CPT

## 2021-11-09 PROCEDURE — 82570 ASSAY OF URINE CREATININE: CPT

## 2021-11-09 PROCEDURE — 2140000000 HC CCU INTERMEDIATE R&B

## 2021-11-09 RX ORDER — POTASSIUM CHLORIDE 20 MEQ/1
40 TABLET, EXTENDED RELEASE ORAL ONCE
Status: COMPLETED | OUTPATIENT
Start: 2021-11-09 | End: 2021-11-09

## 2021-11-09 RX ORDER — SODIUM CHLORIDE 450 MG/100ML
INJECTION, SOLUTION INTRAVENOUS CONTINUOUS
Status: DISCONTINUED | OUTPATIENT
Start: 2021-11-09 | End: 2021-11-11 | Stop reason: HOSPADM

## 2021-11-09 RX ORDER — INSULIN GLARGINE 100 [IU]/ML
10 INJECTION, SOLUTION SUBCUTANEOUS ONCE
Status: COMPLETED | OUTPATIENT
Start: 2021-11-09 | End: 2021-11-09

## 2021-11-09 RX ADMIN — INSULIN GLARGINE 10 UNITS: 100 INJECTION, SOLUTION SUBCUTANEOUS at 22:42

## 2021-11-09 RX ADMIN — HEPARIN SODIUM 5000 UNITS: 10000 INJECTION INTRAVENOUS; SUBCUTANEOUS at 08:49

## 2021-11-09 RX ADMIN — SODIUM BICARBONATE: 84 INJECTION, SOLUTION INTRAVENOUS at 09:03

## 2021-11-09 RX ADMIN — SODIUM CHLORIDE: 4.5 INJECTION, SOLUTION INTRAVENOUS at 12:01

## 2021-11-09 RX ADMIN — POTASSIUM CHLORIDE 40 MEQ: 1500 TABLET, EXTENDED RELEASE ORAL at 11:58

## 2021-11-09 RX ADMIN — Medication 10 ML: at 08:50

## 2021-11-09 ASSESSMENT — PAIN SCALES - GENERAL
PAINLEVEL_OUTOF10: 0

## 2021-11-09 NOTE — CONSULTS
Palliative Care Department  [phone]  Palliative Care Initial Consult  Provider Leo Pierre 1  [id number]  Hospital Day: 4  Date of Initial Consult: 11/9/2021  Referring Provider: Earnestine Duvall MD  Palliative Medicine was consulted for assistance with: Bello Brooks    Brief Summary of Hospital Course: Janie Darby is a 46 y.o. with a medical history of type 1 DM who was admitted on 11/6/2021 from home with a CHIEF COMPLAINT of altered mental status. ASSESSMENT/PLAN:     Pertinent Hospital Problems      DKA   COVID-19 infection   Hyponatremia   Metabolic acidosis   Acute metabolic encephalopathy   Transaminitis   OMI      Palliative Care Encounter / Counseling Regarding Goals of Care  Please see detailed goals of care discussion as below   At this time, Janie Darby, Does have capacity for medical decision-making. Capacity is time limited and situation/question specific   During encounter did not need surrogate medical decision-maker   Outcome of goals of care meeting: continue treatment   Code status Full Code   Advanced Directives: no POA or living will in epic   Surrogate/Legal NOK:  o Go Arndt [phone]  o Other Paraslamont Nolasco [phone]    Spiritual assessment: no spiritual distress identified  Bereavement and grief: to be determined  Referrals to: none today    SUBJECTIVE:     Details of Conversation:  Chart reviewed. Called patient's cell phone, no answer and voicemail full. Called patient's bedside phone, discussed Bello Brooks. She wants out of the hospital.  She feels her kidney function right now is related to not taking her herbal supplement \"kidney well\" that she normally gets OTC. Used to see a nephrologist but feels she doesn't need one at this time. Feels she controls her diabetes well. Asks to have appointment with PCP set up before she leaves, states her PCP is Dr Brian Rosas. Denies any needs. Wants to remain full code.   Not interested in filling out HCPOA or living will at this time. PM will sign off. Discussed with LSW/CM. History Of Present Illness:    From hospitalist note  \"The patient is a 51 y.o. female of Fabi Alberto DO with significant past medical history of type 1 diabetes mellitus who presents with altered mental status for approximately 12 hours.  At the time of my visit she denied having any exposure to COVID-19.     Initial evaluation in the ER, she did have respiratory panel sent and returned positive for COVID-19 and rhinovirus.  Initial labs also showed decreased sodium at 129, anion gap metabolic acidosis with a normal lactic acid level, glucose elevated 890, elevated creatinine 2.1, elevated troponin and 91, elevated liver function tests with alkaline phosphatase at 154, ALT at 46, AST at 61 and a negative urinary drug screen.     She was initially started on diabetic ketoacidosis protocol with insulin drip and on 2021 she had noted to have a closed anion gap, was bridged with 24 units of Lantus and started on a meal. Insulin pump not able to be used here, started on Lantus and SSI until can go back on pump at home. \"    Past Medical History:          Diagnosis Date    DM (diabetes mellitus), type 1 ([facility] [address]        Past Surgical History:          Procedure Laterality Date     SECTION         Medications Prior to Admission:      Prior to Admission medications    Medication Sig Start Date End Date Taking? Authorizing Provider   Insulin Pump - insulin lispro Inject into the skin continuous Insulin-to-Carb Ratio (ICR): Insulin Sensitivity Factor (ISF):  mg/dL per unit of insulin  Target Blood Glucose: mg/dL  Bolus Frequency:    Historical Provider, MD       Allergies:  Patient has no known allergies. Social History:      The patient currently lives at home    TOBACCO:   reports that she has never smoked. She has never used smokeless tobacco.  ETOH:   reports previous alcohol use.       Family History:       Reviewed in detail and positive as follows:    History reviewed. No pertinent family history. REVIEW OF SYSTEMS:   Pertinent positives as noted in the HPI. All other systems reviewed and negative. OBJECTIVE:   Prognosis: depends upon goals and unknown    Physical Exam:  /83   Pulse 68   Temp 98.9 °F (37.2 °C) (Oral)   Resp 18   Ht 5' 2\" (1.575 m)   Wt 120 lb 3.5 oz (54.5 kg)   SpO2 98%   BMI 21.99 kg/m²     No physical exam performed due to COVID restrictions    Objective data reviewed: labs, images, records, medication use, vitals and chart    Labs:     Recent Labs     11/07/21 0435 11/08/21  0624 11/09/21  0534   WBC 9.9 7.9 7.5   HGB 11.5 11.8 11.1*   HCT 33.2* 34.9 31.7*   * 103* 97*     Recent Labs     11/07/21  0147 11/07/21  0435 11/07/21  1148 11/08/21  0624 11/08/21  1745 11/08/21  2110 11/09/21  0534     --   --    < > 144 141 147*   K 4.8  --   --    < > 3.5 3.2* 3.2*   *  --   --    < > 114* 112* 113*   CO2 12*  --   --    < > 18* 19* 23   BUN 74*  --   --    < > 78* 74* 67*   CREATININE 2.6*  --   --    < > 3.1* 3.0* 2.7*   CALCIUM 9.2  --   --    < > 8.5* 8.7 8.1*   PHOS 0.4* 0.6* 2.7  --   --   --   --     < > = values in this interval not displayed. No results for input(s): AST, ALT, BILIDIR, BILITOT, ALKPHOS in the last 72 hours. Recent Labs     11/07/21 0435   INR 1.3     No results for input(s): Atalissa Horsfall in the last 72 hours.     Urinalysis:      Lab Results   Component Value Date    NITRU Negative 11/06/2021    WBCUA 1-3 11/06/2021    BACTERIA MODERATE 11/06/2021    RBCUA 5-10 11/06/2021    BLOODU MODERATE 11/06/2021    SPECGRAV 1.025 11/06/2021    GLUCOSEU >=1000 11/06/2021         Discussed patient and the plan of care with the other IDT members: Palliative Medicine IDT Team, Floor Nurse and Patient    Time/Communication  Greater than 50% of time spent, total 30 minutes in counseling and coordination of care at the bedside regarding goals of care, diagnosis and prognosis and see above. Thank you for allowing Palliative Medicine to participate in the care of William Hercules.

## 2021-11-09 NOTE — PROGRESS NOTES
to have nephrology address her metabolic acidosis which is stable but still worse than yesterday with bicarb infusion. Appreciate the continued recommendations. She has been having fluctuating blood sugars and hypoglycemia protocol has been placed. The fluctuation may be due in part to not having her insulin pump and also rather dosing her as needed. Her sodium is a bit elevated today and potassium is low. Sodium is unlikely to have any clinical influence over hospitalization at the moment, her potassium will be replaced. DVT Prophylaxis Heparin  PT/OT as needed to assess rehab needs  Discharge planning below discharge in the next 1-2 days pending clinical course and work-up from nephrology standpoint.         Kyle Cazares MD  4:36 PM  11/9/2021

## 2021-11-09 NOTE — PROGRESS NOTES
Dr. Kati Rader contacted per perfect serve for [facility]

## 2021-11-09 NOTE — PROGRESS NOTES
Department of Internal Medicine  Nephrology Consult Note    Events reviewed. SUBJECTIVE:  We are following Mrs. Rosales Bentley for OMI and metabolic acidosis. Reports no complaints.       PHYSICAL EXAM:      Vitals:    VITALS:  /83   Pulse 68   Temp 98.9 °F (37.2 °C) (Oral)   Resp 18   Ht 5' 2\" (1.575 m)   Wt 120 lb 3.5 oz (54.5 kg)   SpO2 98%   BMI 21.99 kg/m²   24HR INTAKE/OUTPUT:      Intake/Output Summary (Last 24 hours) at 11/9/2021 0934  Last data filed at 11/9/2021 8391  Gross per 24 hour   Intake    Output 2000 ml   Net -2000 ml       Access: Right femoral triple-lumen catheter  Constitutional: Ill-appearing, awake alert and oriented  HEENT: Unremarkable  NECK: Unremarkable  Respiratory: Lungs clear to auscultation bilaterally  Cardiovascular/Edema: Regular rate and rhythm no murmurs gallops or rubs  Gastrointestinal: Mild tenderness to palpation in the epigastric region  Musculoskeletal: Strength equal bilaterally  Neurologic: No focal neurological deficits  Other: No edema present    Scheduled Meds:   potassium chloride  40 mEq Oral Once    sodium chloride flush  5-40 mL IntraVENous 2 times per day    heparin (porcine)  5,000 Units SubCUTAneous Q8H    insulin glargine  14 Units SubCUTAneous BID    insulin lispro  0-6 Units SubCUTAneous TID WC    insulin lispro  0-3 Units SubCUTAneous Nightly     Continuous Infusions:   IV infusion builder      sodium chloride      dextrose      dextrose 5 % and 0.45 % NaCl 150 mL/hr at 11/07/21 1025     PRN Meds:.sodium chloride flush, sodium chloride, ondansetron **OR** ondansetron, polyethylene glycol, acetaminophen **OR** acetaminophen, glucose, dextrose, glucagon (rDNA), dextrose, dextrose 5 % and 0.45 % NaCl    DATA:    CBC:   Lab Results   Component Value Date    WBC 7.5 11/09/2021    RBC 3.90 11/09/2021    HGB 11.1 11/09/2021    HCT 31.7 11/09/2021    MCV 81.3 11/09/2021    MCH 28.5 11/09/2021    MCHC 35.0 11/09/2021    RDW 14.2 11/09/2021 PLT 97 11/09/2021    MPV 12.6 11/09/2021     CMP:    Lab Results   Component Value Date     11/09/2021    K 3.2 11/09/2021    K 3.5 11/08/2021     11/09/2021    CO2 23 11/09/2021    BUN 67 11/09/2021    CREATININE 2.7 11/09/2021    GFRAA 22 11/09/2021    LABGLOM 19 11/09/2021    GLUCOSE 50 11/09/2021    PROT 7.1 11/06/2021    LABALBU 2.8 11/08/2021    CALCIUM 8.1 11/09/2021    BILITOT 0.2 11/06/2021    ALKPHOS 154 11/06/2021    AST 61 11/06/2021    ALT 46 11/06/2021     Magnesium:    Lab Results   Component Value Date    MG 1.8 11/08/2021     Phosphorus:    Lab Results   Component Value Date    PHOS 2.7 11/07/2021     Urine sodium: 55  Urine potassium: 31.6  Urine chloride: 66  Urine creatinine: 77  Urine ACR: 118 mg/g  Urine PCR: 0.5  FENa: 1.3%    Radiology Review:      CT head without contrast 11/5/21   No acute intracranial abnormality. MRI would be useful if symptoms persist.       CT abdomen without contrast 11/5/21   1. Limited examination due to metallic artifact and paucity of   intra-abdominal fat. 2. No evidence of an acute process   3. Hepatic steatosis. 4. Limited visualization of the gallbladder and pancreas. CXR 11/5/21   No acute cardiopulmonary findings.  PA and lateral views would be useful for   further assessment, if symptoms persist.     Kidney ultrasound November 8, 2021   1.  Normal appearance of the bilateral kidneys.  No hydronephrosis.       2.  A Romero catheter is decompressing the bladder.       3.  Marked gallbladder wall thickening.  The thickened gallbladder wall is   heterogeneous.  Pericholecystic fluid present.  Please correlate with   patient's clinical presentation.           BRIEF SUMMARY OF INITIAL CONSULTATION     Briefly Cal Ramirez is 46 y.o. female with history of type 1 diabetes mellitus , who was admitted on 11/6/2021 . She initially presented to the emergency department after approximately 12 hours of altered mental status.  Patient denies any missing doses of her insulin or increase ingestion of carbohydrates. Initial labs were pertinent for blood glucose of 890, creatinine of 2.1 (baseline 0.8), elevated LFTs, elevated troponin and a negative urine drug screen. CT scan of the head and abdomen were unremarkable. Patient was found to be COVID-19 and rhinovirus positive. Patient was treated via DKA protocol her anion gap closed. Altered mental status resolved. We are consulted for creatinine continued to uptrend to 3.2, bicarbonate 8 and chloride 118. Denies any diarrhea. Resolved issues   · To metabolic encephalopathy, multifactorial 2/2 hypoglycemia, metabolic acidosis  · Transient hypokalemia likely secondary to intracellular shifts due to hyperglycemia in the absence of insulin (resolved)  · HAGMA with elevated beta hydroxybutyrate likely secondary to diabetic ketoacidosis (resolved)    IMPRESSION/RECOMMENDATIONS:      1. OMI stage III, probably ischemic ATN. FENa 1.3% with a component of intravascular volume depletion due to osmotic diuresis;  Kidney ultrasound unremarkable. Renal function continues to improve in response to IV fluids administration. 2. 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 Alkalemia (pH 7.514) with respiratory alkalosis (PCO2: 21.7) and renal compensation. Hyperchloremic metabolic acidosis has resolved with bicarbonate administration. 4. Hypokalemia, 2/2 urinary losses (previous osmotic diuresis and ketonuria)  5. Hypernatremia, 2/2 sodium containing IV fluids administration  6. Hypophosphatemia, phosphorus levels improved, rule out vitamin D deficiency  ------------------------------------------------------  7. COVID-19  8.  Type 1 diabetes mellitus       PLAN:    · Discontinue Romero catheter  · Change IV fluids to 0.45 sodium chloride at 125 cc/hour  · Continue to monitor renal function, BMP at 4 PM  · Obtain for fluid levels  · Replace potassium  · Obtain vitamin D 25 level          Electronically signed by Ena Boothe MD on 11/9/2021 at 9:34 AM

## 2021-11-09 NOTE — PLAN OF CARE
Problem: Airway Clearance - Ineffective  Goal: Achieve or maintain patent airway  11/9/2021 1119 by Mark Aviles RN  Outcome: Met This Shift  11/8/2021 2343 by Attila Randhawa RN  Outcome: Ongoing     Problem: Gas Exchange - Impaired  Goal: Absence of hypoxia  11/9/2021 1119 by Mark Aviles RN  Outcome: Met This Shift  11/8/2021 2343 by Attila Randhawa RN  Outcome: Ongoing  Goal: Promote optimal lung function  11/9/2021 1119 by Mark Aviles RN  Outcome: Met This Shift  11/8/2021 2343 by Attila Randhawa RN  Outcome: Ongoing     Problem: Breathing Pattern - Ineffective  Goal: Ability to achieve and maintain a regular respiratory rate  11/9/2021 1119 by Mark Aviles RN  Outcome: Met This Shift  11/8/2021 2343 by Attila Randhawa RN  Outcome: Ongoing     Problem:  Body Temperature -  Risk of, Imbalanced  Goal: Ability to maintain a body temperature within defined limits  11/9/2021 1119 by Mark Aviles RN  Outcome: Met This Shift  11/8/2021 2343 by Attila Randhawa RN  Outcome: Ongoing  Goal: Will regain or maintain usual level of consciousness  11/9/2021 1119 by Mark Aviles RN  Outcome: Met This Shift  11/8/2021 2343 by Attila Randhawa RN  Outcome: Ongoing  Goal: Complications related to the disease process, condition or treatment will be avoided or minimized  11/9/2021 1119 by Mark Aviles RN  Outcome: Met This Shift  11/8/2021 2343 by Attila Randhawa RN  Outcome: Ongoing     Problem: Loneliness or Risk for Loneliness  Goal: Demonstrate positive use of time alone when socialization is not possible  11/9/2021 1119 by Mark Aviles RN  Outcome: Met This Shift  11/8/2021 2343 by Attila Randhawa RN  Outcome: Ongoing     Problem: Fatigue  Goal: Verbalize increase energy and improved vitality  11/9/2021 1119 by Mark Aviles RN  Outcome: Met This Shift  11/8/2021 2343 by Attila Randhawa RN  Outcome: Ongoing     Problem: Patient Education: Go to Patient Education Activity  Goal:

## 2021-11-10 LAB
ANION GAP SERPL CALCULATED.3IONS-SCNC: 11 MMOL/L (ref 7–16)
BASOPHILS ABSOLUTE: 0.01 E9/L (ref 0–0.2)
BASOPHILS RELATIVE PERCENT: 0.2 % (ref 0–2)
BUN BLDV-MCNC: 47 MG/DL (ref 6–20)
CALCIUM SERPL-MCNC: 8.1 MG/DL (ref 8.6–10.2)
CHLORIDE BLD-SCNC: 110 MMOL/L (ref 98–107)
CO2: 21 MMOL/L (ref 22–29)
CREAT SERPL-MCNC: 2.2 MG/DL (ref 0.5–1)
EOSINOPHILS ABSOLUTE: 0.1 E9/L (ref 0.05–0.5)
EOSINOPHILS RELATIVE PERCENT: 1.9 % (ref 0–6)
GFR AFRICAN AMERICAN: 28
GFR NON-AFRICAN AMERICAN: 23 ML/MIN/1.73
GLUCOSE BLD-MCNC: 198 MG/DL (ref 74–99)
HCT VFR BLD CALC: 30.8 % (ref 34–48)
HEMOGLOBIN: 10.4 G/DL (ref 11.5–15.5)
IMMATURE GRANULOCYTES #: 0.07 E9/L
IMMATURE GRANULOCYTES %: 1.3 % (ref 0–5)
LYMPHOCYTES ABSOLUTE: 1.67 E9/L (ref 1.5–4)
LYMPHOCYTES RELATIVE PERCENT: 31 % (ref 20–42)
MAGNESIUM: 1.7 MG/DL (ref 1.6–2.6)
MCH RBC QN AUTO: 28.7 PG (ref 26–35)
MCHC RBC AUTO-ENTMCNC: 33.8 % (ref 32–34.5)
MCV RBC AUTO: 85.1 FL (ref 80–99.9)
METER GLUCOSE: 107 MG/DL (ref 74–99)
METER GLUCOSE: 149 MG/DL (ref 74–99)
METER GLUCOSE: 44 MG/DL (ref 74–99)
METER GLUCOSE: 71 MG/DL (ref 74–99)
METER GLUCOSE: 86 MG/DL (ref 74–99)
MONOCYTES ABSOLUTE: 0.43 E9/L (ref 0.1–0.95)
MONOCYTES RELATIVE PERCENT: 8 % (ref 2–12)
NEUTROPHILS ABSOLUTE: 3.11 E9/L (ref 1.8–7.3)
NEUTROPHILS RELATIVE PERCENT: 57.6 % (ref 43–80)
OVALOCYTES: ABNORMAL
PDW BLD-RTO: 13.9 FL (ref 11.5–15)
PHOSPHORUS: 1.9 MG/DL (ref 2.5–4.5)
PLATELET # BLD: 87 E9/L (ref 130–450)
PLATELET CONFIRMATION: NORMAL
PMV BLD AUTO: 12.3 FL (ref 7–12)
POIKILOCYTES: ABNORMAL
POTASSIUM SERPL-SCNC: 3.8 MMOL/L (ref 3.5–5)
RBC # BLD: 3.62 E12/L (ref 3.5–5.5)
SODIUM BLD-SCNC: 142 MMOL/L (ref 132–146)
VITAMIN D 25-HYDROXY: 41 NG/ML (ref 30–100)
WBC # BLD: 5.4 E9/L (ref 4.5–11.5)

## 2021-11-10 PROCEDURE — 2140000000 HC CCU INTERMEDIATE R&B

## 2021-11-10 PROCEDURE — 84100 ASSAY OF PHOSPHORUS: CPT

## 2021-11-10 PROCEDURE — 6360000002 HC RX W HCPCS: Performed by: INTERNAL MEDICINE

## 2021-11-10 PROCEDURE — 82306 VITAMIN D 25 HYDROXY: CPT

## 2021-11-10 PROCEDURE — 2580000003 HC RX 258: Performed by: PHYSICIAN ASSISTANT

## 2021-11-10 PROCEDURE — 83735 ASSAY OF MAGNESIUM: CPT

## 2021-11-10 PROCEDURE — 6370000000 HC RX 637 (ALT 250 FOR IP): Performed by: INTERNAL MEDICINE

## 2021-11-10 PROCEDURE — 80048 BASIC METABOLIC PNL TOTAL CA: CPT

## 2021-11-10 PROCEDURE — 36415 COLL VENOUS BLD VENIPUNCTURE: CPT

## 2021-11-10 PROCEDURE — 85025 COMPLETE CBC W/AUTO DIFF WBC: CPT

## 2021-11-10 PROCEDURE — 82962 GLUCOSE BLOOD TEST: CPT

## 2021-11-10 RX ORDER — INSULIN GLARGINE 100 [IU]/ML
8 INJECTION, SOLUTION SUBCUTANEOUS 2 TIMES DAILY
Status: DISCONTINUED | OUTPATIENT
Start: 2021-11-10 | End: 2021-11-11

## 2021-11-10 RX ADMIN — Medication 10 ML: at 20:09

## 2021-11-10 RX ADMIN — INSULIN LISPRO 1 UNITS: 100 INJECTION, SOLUTION INTRAVENOUS; SUBCUTANEOUS at 08:59

## 2021-11-10 RX ADMIN — Medication 10 ML: at 08:44

## 2021-11-10 RX ADMIN — INSULIN GLARGINE 8 UNITS: 100 INJECTION, SOLUTION SUBCUTANEOUS at 20:13

## 2021-11-10 RX ADMIN — INSULIN GLARGINE 8 UNITS: 100 INJECTION, SOLUTION SUBCUTANEOUS at 09:00

## 2021-11-10 RX ADMIN — HEPARIN SODIUM 5000 UNITS: 10000 INJECTION INTRAVENOUS; SUBCUTANEOUS at 08:57

## 2021-11-10 ASSESSMENT — PAIN SCALES - GENERAL
PAINLEVEL_OUTOF10: 0

## 2021-11-10 NOTE — PROGRESS NOTES
Department of Internal Medicine  Nephrology Consult Note    Events reviewed. SUBJECTIVE:  We are following Mrs. Ray Ortiz for OMI and metabolic acidosis. Reports no complaints.       PHYSICAL EXAM:      Vitals:    VITALS:  /74   Pulse 80   Temp 97 °F (36.1 °C) (Oral)   Resp 18   Ht 5' 2\" (1.575 m)   Wt 141 lb (64 kg)   SpO2 98%   BMI 25.79 kg/m²   24HR INTAKE/OUTPUT:      Intake/Output Summary (Last 24 hours) at 11/10/2021 1412  Last data filed at 11/10/2021 9243  Gross per 24 hour   Intake    Output 1900 ml   Net -1900 ml       Access: Right femoral triple-lumen catheter  Constitutional: Ill-appearing, awake alert and oriented  HEENT: Unremarkable  NECK: Unremarkable  Respiratory: Lungs clear to auscultation bilaterally  Cardiovascular/Edema: Regular rate and rhythm no murmurs gallops or rubs  Gastrointestinal: Mild tenderness to palpation in the epigastric region  Musculoskeletal: Strength equal bilaterally  Neurologic: No focal neurological deficits  Other: No edema present    Scheduled Meds:   insulin glargine  8 Units SubCUTAneous BID    potassium & sodium phosphates  1 packet Oral BID    sodium chloride flush  5-40 mL IntraVENous 2 times per day    heparin (porcine)  5,000 Units SubCUTAneous Q8H    insulin lispro  0-6 Units SubCUTAneous TID WC    insulin lispro  0-3 Units SubCUTAneous Nightly     Continuous Infusions:   sodium chloride 125 mL/hr at 11/09/21 1201    sodium chloride      dextrose      dextrose 5 % and 0.45 % NaCl 150 mL/hr at 11/07/21 1025     PRN Meds:.sodium chloride flush, sodium chloride, ondansetron **OR** ondansetron, polyethylene glycol, acetaminophen **OR** acetaminophen, glucose, dextrose, glucagon (rDNA), dextrose, dextrose 5 % and 0.45 % NaCl    DATA:    CBC:   Lab Results   Component Value Date    WBC 5.4 11/10/2021    RBC 3.62 11/10/2021    HGB 10.4 11/10/2021    HCT 30.8 11/10/2021    MCV 85.1 11/10/2021    MCH 28.7 11/10/2021    MCHC 33.8 11/10/2021 RDW 13.9 11/10/2021    PLT 87 11/10/2021    MPV 12.3 11/10/2021     CMP:    Lab Results   Component Value Date     11/10/2021    K 3.8 11/10/2021    K 3.5 11/08/2021     11/10/2021    CO2 21 11/10/2021    BUN 47 11/10/2021    CREATININE 2.2 11/10/2021    GFRAA 28 11/10/2021    LABGLOM 23 11/10/2021    GLUCOSE 198 11/10/2021    PROT 7.1 11/06/2021    LABALBU 2.8 11/08/2021    CALCIUM 8.1 11/10/2021    BILITOT 0.2 11/06/2021    ALKPHOS 154 11/06/2021    AST 61 11/06/2021    ALT 46 11/06/2021     Magnesium:    Lab Results   Component Value Date    MG 1.7 11/10/2021     Phosphorus:    Lab Results   Component Value Date    PHOS 1.9 11/10/2021     Urine sodium: 55  Urine potassium: 31.6  Urine chloride: 66  Urine creatinine: 77  Urine ACR: 118 mg/g  Urine PCR: 0.5  FENa: 1.3%    Radiology Review:      CT head without contrast 11/5/21   No acute intracranial abnormality. MRI would be useful if symptoms persist.       CT abdomen without contrast 11/5/21   1. Limited examination due to metallic artifact and paucity of   intra-abdominal fat. 2. No evidence of an acute process   3. Hepatic steatosis. 4. Limited visualization of the gallbladder and pancreas. CXR 11/5/21   No acute cardiopulmonary findings.  PA and lateral views would be useful for   further assessment, if symptoms persist.     Kidney ultrasound November 8, 2021   1.  Normal appearance of the bilateral kidneys.  No hydronephrosis.       2.  A Romero catheter is decompressing the bladder.       3.  Marked gallbladder wall thickening.  The thickened gallbladder wall is   heterogeneous.  Pericholecystic fluid present.  Please correlate with   patient's clinical presentation.           BRIEF SUMMARY OF INITIAL CONSULTATION     Briefly Kinza Amaro is 46 y.o. female with history of type 1 diabetes mellitus , who was admitted on 11/6/2021 .  She initially presented to the emergency department after approximately 12 hours of altered mental status. Patient denies any missing doses of her insulin or increase ingestion of carbohydrates. Initial labs were pertinent for blood glucose of 890, creatinine of 2.1 (baseline 0.8), elevated LFTs, elevated troponin and a negative urine drug screen. CT scan of the head and abdomen were unremarkable. Patient was found to be COVID-19 and rhinovirus positive. Patient was treated via DKA protocol her anion gap closed. Altered mental status resolved. We are consulted for creatinine continued to uptrend to 3.2, bicarbonate 8 and chloride 118. Denies any diarrhea. Resolved issues   · To metabolic encephalopathy, multifactorial 2/2 hypoglycemia, metabolic acidosis  · Transient hypokalemia likely secondary to intracellular shifts due to hyperglycemia in the absence of insulin (resolved)  · HAGMA with elevated beta hydroxybutyrate likely secondary to diabetic ketoacidosis (resolved)  · Hypokalemia, 2/2 urinary losses (previous osmotic diuresis and ketonuria)    IMPRESSION/RECOMMENDATIONS:      1. OMI stage III, probably ischemic ATN. FENa 1.3% with a component of intravascular volume depletion due to osmotic diuresis;  Kidney ultrasound unremarkable. Renal function continues to improve in response to IV fluids administration. 2. 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 Alkalemia (pH 7.514) with respiratory alkalosis (PCO2: 21.7) and renal compensation. Hyperchloremic metabolic acidosis has resolved with bicarbonate administration. 4. Hypernatremia, 2/2 sodium containing IV fluids administration. Sodium levels improving  5. Hypophosphatemia, vitamin D level 41, will start neutraphos bid X 5 days  ------------------------------------------------------  6. COVID-19  7.  Type 1 diabetes mellitus       PLAN:    · Continue IV fluids to 0.45 sodium chloride at 125 cc/hour  · Continue to monitor renal function  · Obtain albumin level in am  · Replace potassium and phosphorus          Electronically signed by MONIKA Cool CNP on 11/10/2021 at 2:12 PM

## 2021-11-10 NOTE — PLAN OF CARE
Problem: Airway Clearance - Ineffective  Goal: Achieve or maintain patent airway  Outcome: Met This Shift     Problem: Gas Exchange - Impaired  Goal: Absence of hypoxia  Outcome: Met This Shift  Goal: Promote optimal lung function  Outcome: Met This Shift     Problem: Breathing Pattern - Ineffective  Goal: Ability to achieve and maintain a regular respiratory rate  Outcome: Met This Shift     Problem: Nutrition Deficits  Goal: Optimize nutritional status  Outcome: Met This Shift     Problem: Risk for Fluid Volume Deficit  Goal: Maintain normal heart rhythm  Outcome: Met This Shift  Goal: Maintain absence of muscle cramping  Outcome: Met This Shift  Goal: Maintain normal serum potassium, sodium, calcium, phosphorus, and pH  Outcome: Met This Shift     Problem: Patient Education: Go to Patient Education Activity  Goal: Patient/Family Education  Outcome: Met This Shift

## 2021-11-10 NOTE — PROGRESS NOTES
Subjective:  Alexis Corbett remains hospitalized and is frustrated with this. She would like to be discharged however I did let her know she remains hospitalized due to her kidney function. She would require nephrology clearance and appropriate follow-up for discharge. Objective:    /74   Pulse 80   Temp 97 °F (36.1 °C) (Oral)   Resp 18   Ht 5' 2\" (1.575 m)   Wt 141 lb (64 kg)   SpO2 98%   BMI 25.79 kg/m²     In: -   Out: 1900   In: -   Out: 1900 [Urine:1900]    General Appearance: Well-developed, no acute distress. Head/face:  NCAT  Eyes:  No gross abnormalities. Lungs: Adequate effort, adequate air entry bilaterally  Heart:  Heart sounds are normal.    Bradycardia noted. Abdomen: Mild tenderness to palpation epigastric area, no rebound, no guarding, no distention, positive bowel sounds. Extremities: No edema noted  Neurologic: No focal neurological deficits. Recent Labs     11/08/21  0624 11/09/21  0534 11/10/21  0457   WBC 7.9 7.5 5.4   HGB 11.8 11.1* 10.4*   HCT 34.9 31.7* 30.8*   * 97* 87*       Recent Labs     11/09/21  0534 11/09/21  1721 11/10/21  0457   * 144 142   K 3.2* 3.6 3.8   * 108* 110*   CO2 23 24 21*   BUN 67* 57* 47*   CREATININE 2.7* 2.5* 2.2*   CALCIUM 8.1* 8.6 8.1*       Assessment:    Active Problems:    DKA, type 2, not at goal [facility])    OMI (acute kidney injury) ([facility] [address]  COVID-19 infection  Metabolic acidosis related to #1worsening  Elevated beta hydroxybutyrate related to #1  Acute kidney injury related #1  Dehydration related to #1  Pseudohyponatremia related to hyperglycemia  Hypophosphatemiareplaced  Hypokalemiareplaced      Plan:    Alexis Corbett remains hospitalized for her increased creatinine and BUN. She does have a history of kidney disease however unable to tolerate her baseline BUN/creatinine. Patient wants to leave AMA yesterday however did decide to stay as she could not afford the hospitalization if she left AMA.   Today she is requesting discharge, I did let her know we will continue to follow her kidney function which is still elevated. We would need nephrology to clear her and at that point she may follow-up with an outpatient provider and repeat her lab work as an outpatient. We will continue to monitor her kidney function, electrolytes and blood counts daily. I also have noted that she did have episodic hypoglycemia overnight and she does not take Lantus however as mentioned in previous note when unable to give her insulin pump. Thus, I have reduced her Lantus from 14 units to 8 units twice daily and this should prevent hypoglycemia. Next step would be to take off Lantus altogether and work to titrate her up.       DVT Prophylaxis Heparin  PT/OT as needed to assess rehab needs  Discharge planning below discharge in the next 1-2 days pending clinical course and work-up from nephrology standpoint.  Giselle Toure MD  2:16 PM  11/10/2021

## 2021-11-11 VITALS
WEIGHT: 141.3 LBS | SYSTOLIC BLOOD PRESSURE: 128 MMHG | DIASTOLIC BLOOD PRESSURE: 75 MMHG | TEMPERATURE: 98 F | OXYGEN SATURATION: 98 % | BODY MASS INDEX: 26 KG/M2 | HEIGHT: 62 IN | RESPIRATION RATE: 18 BRPM | HEART RATE: 67 BPM

## 2021-11-11 LAB
ALBUMIN SERPL-MCNC: 2.3 G/DL (ref 3.5–5.2)
ALP BLD-CCNC: 117 U/L (ref 35–104)
ALT SERPL-CCNC: 101 U/L (ref 0–32)
ANION GAP SERPL CALCULATED.3IONS-SCNC: 11 MMOL/L (ref 7–16)
AST SERPL-CCNC: 59 U/L (ref 0–31)
BASOPHILS ABSOLUTE: 0.01 E9/L (ref 0–0.2)
BASOPHILS RELATIVE PERCENT: 0.2 % (ref 0–2)
BILIRUB SERPL-MCNC: 0.3 MG/DL (ref 0–1.2)
BLOOD CULTURE, ROUTINE: NORMAL
BUN BLDV-MCNC: 27 MG/DL (ref 6–20)
CALCIUM SERPL-MCNC: 8 MG/DL (ref 8.6–10.2)
CHLORIDE BLD-SCNC: 112 MMOL/L (ref 98–107)
CO2: 22 MMOL/L (ref 22–29)
CREAT SERPL-MCNC: 1.7 MG/DL (ref 0.5–1)
CULTURE, BLOOD 2: NORMAL
EOSINOPHILS ABSOLUTE: 0.09 E9/L (ref 0.05–0.5)
EOSINOPHILS RELATIVE PERCENT: 1.4 % (ref 0–6)
GFR AFRICAN AMERICAN: 38
GFR NON-AFRICAN AMERICAN: 32 ML/MIN/1.73
GLUCOSE BLD-MCNC: 31 MG/DL (ref 74–99)
HCT VFR BLD CALC: 29.9 % (ref 34–48)
HEMOGLOBIN: 9.9 G/DL (ref 11.5–15.5)
IMMATURE GRANULOCYTES #: 0.07 E9/L
IMMATURE GRANULOCYTES %: 1.1 % (ref 0–5)
LYMPHOCYTES ABSOLUTE: 1.58 E9/L (ref 1.5–4)
LYMPHOCYTES RELATIVE PERCENT: 25.2 % (ref 20–42)
MAGNESIUM: 1.6 MG/DL (ref 1.6–2.6)
MCH RBC QN AUTO: 28.1 PG (ref 26–35)
MCHC RBC AUTO-ENTMCNC: 33.1 % (ref 32–34.5)
MCV RBC AUTO: 84.9 FL (ref 80–99.9)
METER GLUCOSE: 117 MG/DL (ref 74–99)
METER GLUCOSE: 120 MG/DL (ref 74–99)
METER GLUCOSE: 51 MG/DL (ref 74–99)
METER GLUCOSE: 92 MG/DL (ref 74–99)
MONOCYTES ABSOLUTE: 0.66 E9/L (ref 0.1–0.95)
MONOCYTES RELATIVE PERCENT: 10.5 % (ref 2–12)
NEUTROPHILS ABSOLUTE: 3.87 E9/L (ref 1.8–7.3)
NEUTROPHILS RELATIVE PERCENT: 61.6 % (ref 43–80)
PDW BLD-RTO: 13.7 FL (ref 11.5–15)
PHOSPHORUS: 2.7 MG/DL (ref 2.5–4.5)
PLATELET # BLD: 167 E9/L (ref 130–450)
PMV BLD AUTO: 12 FL (ref 7–12)
POTASSIUM SERPL-SCNC: 3.4 MMOL/L (ref 3.5–5)
RBC # BLD: 3.52 E12/L (ref 3.5–5.5)
SODIUM BLD-SCNC: 145 MMOL/L (ref 132–146)
TOTAL PROTEIN: 4.7 G/DL (ref 6.4–8.3)
WBC # BLD: 6.3 E9/L (ref 4.5–11.5)

## 2021-11-11 PROCEDURE — 36415 COLL VENOUS BLD VENIPUNCTURE: CPT

## 2021-11-11 PROCEDURE — 84100 ASSAY OF PHOSPHORUS: CPT

## 2021-11-11 PROCEDURE — 82962 GLUCOSE BLOOD TEST: CPT

## 2021-11-11 PROCEDURE — 83735 ASSAY OF MAGNESIUM: CPT

## 2021-11-11 PROCEDURE — 85025 COMPLETE CBC W/AUTO DIFF WBC: CPT

## 2021-11-11 PROCEDURE — 80053 COMPREHEN METABOLIC PANEL: CPT

## 2021-11-11 RX ORDER — INSULIN GLARGINE 100 [IU]/ML
4 INJECTION, SOLUTION SUBCUTANEOUS 2 TIMES DAILY
Status: DISCONTINUED | OUTPATIENT
Start: 2021-11-11 | End: 2021-11-11

## 2021-11-11 RX ORDER — INSULIN GLARGINE 100 [IU]/ML
4 INJECTION, SOLUTION SUBCUTANEOUS DAILY
Status: DISCONTINUED | OUTPATIENT
Start: 2021-11-12 | End: 2021-11-11 | Stop reason: HOSPADM

## 2021-11-11 ASSESSMENT — PAIN SCALES - GENERAL
PAINLEVEL_OUTOF10: 0
PAINLEVEL_OUTOF10: 0

## 2021-11-11 NOTE — PROGRESS NOTES
Pt refuses Potassium supplements. Pt states \"its not natural enough, I know they want to help me, but its going to affect my body, something is giving me a rash. \"    Dr. Jaylen Marquis notified.

## 2021-11-11 NOTE — PROGRESS NOTES
Department of Internal Medicine  Nephrology Consult Note    Events reviewed. SUBJECTIVE:  We are following Mrs. Zeferino Smith for OMI and metabolic acidosis. Reports no complaints. Patient wants to be discharged today.       PHYSICAL EXAM:      Vitals:    VITALS:  /75   Pulse 67   Temp 98 °F (36.7 °C) (Oral)   Resp 18   Ht 5' 2\" (1.575 m)   Wt 141 lb 4.8 oz (64.1 kg)   SpO2 98%   BMI 25.84 kg/m²   24HR INTAKE/OUTPUT:      Intake/Output Summary (Last 24 hours) at 11/11/2021 1359  Last data filed at 11/10/2021 1800  Gross per 24 hour   Intake    Output 1000 ml   Net -1000 ml       Access: Right femoral triple-lumen catheter  Constitutional: Ill-appearing, awake alert and oriented  HEENT: Unremarkable  NECK: Unremarkable  Respiratory: Lungs clear to auscultation bilaterally  Cardiovascular/Edema: Regular rate and rhythm no murmurs gallops or rubs  Gastrointestinal: Mild tenderness to palpation in the epigastric region  Musculoskeletal: Strength equal bilaterally  Neurologic: No focal neurological deficits  Other: No edema present    Scheduled Meds:   [START ON 11/12/2021] insulin glargine  4 Units SubCUTAneous Daily    potassium bicarb-citric acid  20 mEq Oral BID    potassium & sodium phosphates  1 packet Oral BID    sodium chloride flush  5-40 mL IntraVENous 2 times per day    heparin (porcine)  5,000 Units SubCUTAneous Q8H    insulin lispro  0-6 Units SubCUTAneous TID WC    insulin lispro  0-3 Units SubCUTAneous Nightly     Continuous Infusions:   sodium chloride 125 mL/hr at 11/09/21 1201    sodium chloride      dextrose      dextrose 5 % and 0.45 % NaCl 150 mL/hr at 11/07/21 1025     PRN Meds:.sodium chloride flush, sodium chloride, ondansetron **OR** ondansetron, polyethylene glycol, acetaminophen **OR** acetaminophen, glucose, dextrose, glucagon (rDNA), dextrose, dextrose 5 % and 0.45 % NaCl    DATA:    CBC:   Lab Results   Component Value Date    WBC 6.3 11/11/2021    RBC 3.52 11/11/2021    HGB 9.9 11/11/2021    HCT 29.9 11/11/2021    MCV 84.9 11/11/2021    MCH 28.1 11/11/2021    MCHC 33.1 11/11/2021    RDW 13.7 11/11/2021     11/11/2021    MPV 12.0 11/11/2021     CMP:    Lab Results   Component Value Date     11/11/2021    K 3.4 11/11/2021    K 3.5 11/08/2021     11/11/2021    CO2 22 11/11/2021    BUN 27 11/11/2021    CREATININE 1.7 11/11/2021    GFRAA 38 11/11/2021    LABGLOM 32 11/11/2021    GLUCOSE 31 11/11/2021    PROT 4.7 11/11/2021    LABALBU 2.3 11/11/2021    CALCIUM 8.0 11/11/2021    BILITOT 0.3 11/11/2021    ALKPHOS 117 11/11/2021    AST 59 11/11/2021     11/11/2021     Magnesium:    Lab Results   Component Value Date    MG 1.6 11/11/2021     Phosphorus:    Lab Results   Component Value Date    PHOS 2.7 11/11/2021     Urine sodium: 55  Urine potassium: 31.6  Urine chloride: 66  Urine creatinine: 77  Urine ACR: 118 mg/g  Urine PCR: 0.5  FENa: 1.3%    Radiology Review:      CT head without contrast 11/5/21   No acute intracranial abnormality. MRI would be useful if symptoms persist.       CT abdomen without contrast 11/5/21   1. Limited examination due to metallic artifact and paucity of   intra-abdominal fat. 2. No evidence of an acute process   3. Hepatic steatosis. 4. Limited visualization of the gallbladder and pancreas.        CXR 11/5/21   No acute cardiopulmonary findings.  PA and lateral views would be useful for   further assessment, if symptoms persist.     Kidney ultrasound November 8, 2021   1.  Normal appearance of the bilateral kidneys.  No hydronephrosis.       2.  A Romero catheter is decompressing the bladder.       3.  Marked gallbladder wall thickening.  The thickened gallbladder wall is   heterogeneous.  Pericholecystic fluid present.  Please correlate with   patient's clinical presentation.           BRIEF SUMMARY OF INITIAL CONSULTATION     Briefly Murlean Reasons is 46 y.o. female with history of type 1 diabetes mellitus , who was admitted on 11/6/2021 . She initially presented to the emergency department after approximately 12 hours of altered mental status. Patient denies any missing doses of her insulin or increase ingestion of carbohydrates. Initial labs were pertinent for blood glucose of 890, creatinine of 2.1 (baseline 0.8), elevated LFTs, elevated troponin and a negative urine drug screen. CT scan of the head and abdomen were unremarkable. Patient was found to be COVID-19 and rhinovirus positive. Patient was treated via DKA protocol her anion gap closed. Altered mental status resolved. We are consulted for creatinine continued to uptrend to 3.2, bicarbonate 8 and chloride 118. Denies any diarrhea. Resolved issues   · To metabolic encephalopathy, multifactorial 2/2 hypoglycemia, metabolic acidosis  · Transient hypokalemia likely secondary to intracellular shifts due to hyperglycemia in the absence of insulin (resolved)  · HAGMA with elevated beta hydroxybutyrate likely secondary to diabetic ketoacidosis (resolved)  · Hypokalemia, 2/2 urinary losses (previous osmotic diuresis and ketonuria)    IMPRESSION/RECOMMENDATIONS:      1. OMI stage III, probably ischemic ATN. FENa 1.3% with a component of intravascular volume depletion due to osmotic diuresis;  Kidney ultrasound unremarkable. Renal function continues to improve in response to IV fluids administration, creatinine down to 1.7 mg/dL. Patient wants to be discharged today. She is refusing to take bicarbonate or potassium supplementation. 2. 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 Alkalemia (pH 7.514) with respiratory alkalosis (PCO2: 21.7) and renal compensation. Hyperchloremic metabolic acidosis has resolved with bicarbonate administration. 4. Hypernatremia, 2/2 sodium containing IV fluids administration. Sodium levels improving  5.  Hypophosphatemia, vitamin D level 41, on neutraphos bid X 5 days  ------------------------------------------------------  6. COVID-19  7.  Type 1 diabetes mellitus       PLAN:    · Continue IV fluids to 0.45 sodium chloride at 125 cc/hour  · Discharge this afternoon  · Follow-up with us in 2 to 4 weeks  · BMP in 2 weeks          Electronically signed by Amalia Falk MD on 11/11/2021 at 1:59 PM

## 2021-11-11 NOTE — CARE COORDINATION
Discharge order noted. Attempted to reach patient via phone but no answer. Per nursing, patient's family to transport home, has [facility] medicaid for prescriptions and has no other needs.     Parul Ibarra, MSW, LSW [phone]

## 2021-11-11 NOTE — PROGRESS NOTES
Notified Dr. Gifty Naidu that nephrology ok with discharge later after potassium but patient refused potassium.

## 2021-11-11 NOTE — DISCHARGE SUMMARY
Physician Discharge Summary     Patient ID:  Hoa Matthew  [id number]  46 y.o.  1970    Admit date: 11/6/2021    Discharge date and time: 11/11/2021    Admission Diagnoses:   Patient Active Problem List   Diagnosis    Multiple trauma    Trauma    Subdural hematoma ([facility]    DKA, type 2, not at goal [facility])    OMI (acute kidney injury) ([facility]       Discharge Diagnoses:     DKA, type 2, not at goal [facility])  Merrily Laureano (acute kidney injury) ([facility]  COVID-19 infection  Metabolic acidosis related to #1worsening  Elevated beta hydroxybutyrate related to #1  Acute kidney injury related #1  Dehydration related to #1  Pseudohyponatremia related to hyperglycemia  Hypophosphatemiareplaced  Hypokalemiareplaced    Consults: nephrology    Procedures: None    Hospital Course: The patient is a 46 y.o. female of [facility]  with significant past medical history of type 1 diabetes mellitus on insulin pump, history of renal disease who presents with diabetic ketoacidosis and COVID-19 infection. She did not require oxygen throughout hospitalization and her gap closed within 24 hours of admission. However she was noted to have acute kidney injury and metabolic acidosis that was severe. Nephrology consulted and over the next few days patient's creatinine had trended downward, we are unsure of her baseline. Her metabolic acidosis also did vastly improve after sodium bicarbonate infusion and nephrology recommendations. She will be asked to follow-up within 1 week for posthospitalization follow-up and repeat lab work, BMP. She is also been asked to remain in isolation she did test positive for COVID-19. We will ask of her to isolate for another 7 to 10 days at the minimum.     Recent Labs     11/09/21  0534 11/10/21  0457 11/11/21  0536   WBC 7.5 5.4 6.3   HGB 11.1* 10.4* 9.9*   HCT 31.7* 30.8* 29.9*   PLT 97* 87* 167       Recent Labs     11/09/21  1721 11/10/21  0457 11/11/21  0536    142 145   K 3.6 3.8 3.4*   CL 108* 110* 112*   CO2 24 21* 22   BUN 57* 47* 27*   CREATININE 2.5* 2.2* 1.7*   CALCIUM 8.6 8.1* 8.0*       CT ABDOMEN WO CONTRAST Additional Contrast? None    Result Date: 11/6/2021  EXAMINATION: CT ABDOMEN WITHOUT CONTRAST 11/6/2021 3:45 am TECHNIQUE: CT of the abdomen was performed without the administration of intravenous contrast. Multiplanar reformatted images are provided for review. Dose modulation, iterative reconstruction, and/or weight based adjustment of the mA/kV was utilized to reduce the radiation dose to as low as reasonably achievable. COMPARISON: September 18, 2020 HISTORY: ORDERING SYSTEM PROVIDED HISTORY: ABDOMINAL PAIN TECHNOLOGIST PROVIDED HISTORY: Reason for exam:->ABDOMINAL PAIN Additional Contrast?->None Decision Support Exception - unselect if not a suspected or confirmed emergency medical condition->Emergency Medical Condition (MA) What reading provider will be dictating this exam?->CRC FINDINGS: Generalized decreased attenuation throughout the liver related to hepatic steatosis. No intrahepatic bile duct dilatation. Gallbladder is not visualized. Limited visualization of the pancreas. Spleen is nonenlarged. There is no hydronephrosis. No perinephric edema. No bowel obstruction, free air, or free fluid. Romero catheter is present. Appendix is not definitively visualized, however no focal inflammatory changes in its expected location. No airspace opacity or pleural effusion in the visualized lung bases. Stable chronic fractures involving superior endplates of T9, R10, and T12.     1. Limited examination due to metallic artifact and paucity of intra-abdominal fat. 2. No evidence of an acute process 3. Hepatic steatosis. 4. Limited visualization of the gallbladder and pancreas.      CT HEAD WO CONTRAST    Result Date: 11/6/2021  EXAMINATION: CT OF THE HEAD WITHOUT CONTRAST  11/6/2021 12:37 am TECHNIQUE: CT of the head was performed without the administration of intravenous contrast. medications    Insulin Pump - insulin lispro          Activity: activity as tolerated  Diet: diabetic diet    Pt has been advised to: Follow-up with Tejas Garza DO in 1 week.   Follow-up with consultants as recommended by them    Note that over 30 minutes was spent in preparing discharge papers, discussing discharge with patient, medication review, etc.    Signed:  Jasson Mendoza MD  11/11/2021  1:23 PM

## 2021-11-12 ENCOUNTER — CARE COORDINATION (OUTPATIENT)
Dept: CASE MANAGEMENT | Age: 51
End: 2021-11-12

## 2021-11-12 NOTE — CARE COORDINATION
Patient contacted regarding COVID-19 diagnosis. Discussed COVID-19 related testing which was available at this time. Test results were positive. Patient informed of results, if available? Patient is aware of positive results. Care Transition Nurse contacted the patient by telephone to perform post discharge assessment. Call within 2 business days of discharge: Yes. Verified name and  with patient as identifiers. Provided introduction to self, and explanation of the CTN role, and reason for call due to risk factors for infection and/or exposure to COVID-19. Symptoms reviewed with patient who verbalized the following symptoms: hoarse voice. -patient has an insulin pump; reports BS range       Patient reports she will be following up with her Endocrinologist post quarantine and will have labs drawn at that time. [facility] follow up appointment(s): No future appointments. Non-face-to-face services provided:  Obtained and reviewed discharge summary and/or continuity of care documents     Advance Care Planning:   Does patient have an Advance Directive:  decision maker updated. Educated patient about risk for severe COVID-19 due to risk factors according to CDC guidelines. CTN reviewed discharge instructions, medical action plan and red flag symptoms with the patient who verbalized understanding. Discussed COVID vaccination status: Unvaccinated. Education provided on COVID-19 vaccination as appropriate. Discussed exposure protocols and quarantine with CDC Guidelines. Patient was given an opportunity to verbalize any questions and concerns and agrees to contact CTN or health care provider for questions related to their healthcare. Reviewed and educated patient on any new and changed medications related to discharge diagnosis; no new medications     Was patient discharged with a pulse oximeter? No   Discussed discharge instructions and when to notify provider or seek emergency care.        CTN provided contact information. Plan for follow-up call in 5-7 days based on severity of symptoms and risk factors.

## 2021-11-18 ENCOUNTER — CARE COORDINATION (OUTPATIENT)
Dept: CASE MANAGEMENT | Age: 51
End: 2021-11-18

## 2021-11-18 NOTE — CARE COORDINATION
[facility] Transitions Follow Up Call    2021    Patient: Annie Ortega  Patient : 1970   MRN: [id number]  Reason for Admission:   Discharge Date: 21 RARS: Readmission Risk Score: 11.5 ( )    Attempted to reach patient by phone regarding follow up; COVID-19 Monitoring. HIPAA compliant message left on patient's voicemail; CTN contact information provided.         Ortiz Moncada RN

## 2021-11-19 ENCOUNTER — CARE COORDINATION (OUTPATIENT)
Dept: CASE MANAGEMENT | Age: 51
End: 2021-11-19

## 2021-11-19 NOTE — CARE COORDINATION
Patient resolved from the Care Transitions episode on 11/19/2021      Patient/family has been provided the following resources and education related to COVID-19:                         Signs, symptoms and red flags related to COVID-19            CDC exposure and quarantine guidelines            Contact for their local Department of Health                 Patient currently reports that the following symptoms have improved:  reports hoarse voice has resolved. -Patient reports she feels a lot better.  -denies fever, chills, shortness of breath, or chest discomfort  -patient reports she has had BLE edema since hospital admission/discharge to home; reports PCP is aware; f/u appointment/labs with PCP on 11/24/2021.   -reports BS range   -voices no needs or concerns at this time    Discussed when to notify Provider or seek emergency care. No further outreach scheduled with this CTN. Episode of Care resolved. Patient has this CTN contact information if future needs arise.

## 2021-12-01 LAB
AVERAGE GLUCOSE: NORMAL
HBA1C MFR BLD: 8.5 %

## 2021-12-22 ENCOUNTER — OFFICE VISIT (OUTPATIENT)
Dept: ENDOCRINOLOGY | Age: 51
End: 2021-12-22
Payer: COMMERCIAL

## 2021-12-22 VITALS
HEIGHT: 62 IN | SYSTOLIC BLOOD PRESSURE: 162 MMHG | BODY MASS INDEX: 19.91 KG/M2 | RESPIRATION RATE: 16 BRPM | DIASTOLIC BLOOD PRESSURE: 74 MMHG | HEART RATE: 74 BPM | WEIGHT: 108.2 LBS

## 2021-12-22 DIAGNOSIS — Z91.119 DIETARY NONCOMPLIANCE: ICD-10-CM

## 2021-12-22 DIAGNOSIS — E10.9 TYPE 1 DIABETES MELLITUS WITHOUT COMPLICATION (HCC): Primary | ICD-10-CM

## 2021-12-22 DIAGNOSIS — R79.89 ABNORMAL THYROID BLOOD TEST: ICD-10-CM

## 2021-12-22 PROCEDURE — G8427 DOCREV CUR MEDS BY ELIG CLIN: HCPCS | Performed by: CLINICAL NURSE SPECIALIST

## 2021-12-22 PROCEDURE — 2022F DILAT RTA XM EVC RTNOPTHY: CPT | Performed by: CLINICAL NURSE SPECIALIST

## 2021-12-22 PROCEDURE — G8420 CALC BMI NORM PARAMETERS: HCPCS | Performed by: CLINICAL NURSE SPECIALIST

## 2021-12-22 PROCEDURE — G8484 FLU IMMUNIZE NO ADMIN: HCPCS | Performed by: CLINICAL NURSE SPECIALIST

## 2021-12-22 PROCEDURE — 1036F TOBACCO NON-USER: CPT | Performed by: CLINICAL NURSE SPECIALIST

## 2021-12-22 PROCEDURE — 3052F HG A1C>EQUAL 8.0%<EQUAL 9.0%: CPT | Performed by: CLINICAL NURSE SPECIALIST

## 2021-12-22 PROCEDURE — 3017F COLORECTAL CA SCREEN DOC REV: CPT | Performed by: CLINICAL NURSE SPECIALIST

## 2021-12-22 PROCEDURE — 99205 OFFICE O/P NEW HI 60 MIN: CPT | Performed by: CLINICAL NURSE SPECIALIST

## 2021-12-22 NOTE — PROGRESS NOTES
[address] [place] [address] Department of Endocrinology Diabetes and Metabolism   [address] [facility] [address]   Phone: [phone]  Fax: [phone]    Date of Service: 12/22/2021    Primary Care Physician: Lilli Puente DO  Referring physician: Krysta Leon DO  Provider: MONIKA Patterson    Reason for the visit:  Type 1 DM       History of Present Illness: The history is provided by the patient. No  was used. Accuracy of the patient data is excellent. Elmo Friedman is a very pleasant 46 y.o. female seen today for diabetes management     Elmo Friedman was diagnosed with diabetes, type 1 at age 10  and currently on Novolog (Lispro ordered but has not started it as she is finshing her Novolog supply. She has been using a pump since 2000. She is currently using [employer] revel 723 . She is not entering her BS into the pump. She does not like to use the Bolus Wizard and computes it in her head. She wants to determine her own rates and does not want provider assistance. There is no carb ratio, sensitivity, or blood glucose target in the insulin pump  Insulin pump settings:  · 0000 0.525, 3 AM 0.525, 4 AM 0.5, 6 AM 0.45. 630 am 0.475, 8 AM 0.475, 10 AM 0.5, 11:30 AM 0.475, 2:30 PM 0.55, carb ratio 1 unit for every 20 g of carbs, sensitivity 75, blood glucose target 100-1 30, active insulin time 6 hours    The patient has been checking blood sugar  4+ times a day via fingerstick with a Contour Next Link 2.4 glucometer . Most recent A1c results summarized below  Lab Results   Component Value Date    LABA1C 8.5 12/01/2021        Patient reported hypoglycemic episodes.  Usually in the morning.  good hypoglycemic awareness    The patient has been mindful of what has been eating and following diabetic diet as encouraged  I reviewed current medications and the patient has no issues with diabetes medications  Elmo Friedman is up to date with eye exam and denied any history of diabetic retinopathy    Last eye exam was early  . The patient is not  seeing podiatrist.   And also performs  own feet care  Microvascular complications:  No Retinopathy,+ Nephropathy (presently presumed OMI in the setting of COVID due to 2021 [facility]) or no Neuropathy   Macrovascular complications: no CAD, PVD, or Stroke   The patient refuses Flushot and pneumonia vaccine  Due to intolerance    PAST MEDICAL HISTORY   Past Medical History:   Diagnosis Date    DM (diabetes mellitus), type 1 ([address]        PAST SURGICAL HISTORY   Past Surgical History:   Procedure Laterality Date     SECTION         SOCIAL HISTORY   Tobacco:   reports that she has never smoked. She has never used smokeless tobacco.  Alcohol:   reports previous alcohol use. Drugs:   reports previous drug use. FAMILY HISTORY   No family history on file. ALLERGIES AND DRUG REACTIONS   No Known Allergies    CURRENT MEDICATIONS   Current Outpatient Medications   Medication Sig Dispense Refill    Insulin Pump - insulin lispro Inject into the skin continuous Insulin-to-Carb Ratio (ICR): Insulin Sensitivity Factor (ISF):  mg/dL per unit of insulin  Target Blood Glucose: mg/dL  Bolus Frequency:       No current facility-administered medications for this visit. Review of Systems  Constitutional: No fever, no chills, no diaphoresis, no generalized weakness. HEENT: No blurred vision, No sore throat, no ear pain, no hair loss  Neck: denied any neck swelling, difficulty swallowing,   Cardio-pulmonary: No CP, SOB or palpitation, No orthopnea or PND. No cough or wheezing. GI: No N/V/D, no constipation, No abdominal pain, no melena or hematochezia   : Denied any dysuria, hematuria, flank pain, discharge, or incontinence. Skin: denied any rash, ulcer, Hirsute, or hyperpigmentation. MSK: denied any joint deformity, joint pain/swelling, muscle pain, or back pain.   Neuro: no numbness, no tingling, no weakness    OBJECTIVE    BP (!) 162/74   Pulse 74   Resp 16   Ht 5' 2\" (1.575 m)   Wt 108 lb 3.2 oz (49.1 kg)   BMI 19.79 kg/m²   BP Readings from Last 4 Encounters:   12/22/21 (!) 162/74   11/11/21 128/75   10/23/20 125/77   10/09/20 (!) 146/87     Wt Readings from Last 6 Encounters:   12/22/21 108 lb 3.2 oz (49.1 kg)   11/11/21 141 lb 4.8 oz (64.1 kg)   10/23/20 115 lb (52.2 kg)   10/09/20 117 lb (53.1 kg)   09/18/20 125 lb (56.7 kg)       Physical examination:  General: awake alert, oriented x3, no abnormal position or movements. HEENT: normocephalic non-traumatic, no exophthalmos   Neck: supple, no LN enlargement, no thyromegaly, no thyroid tenderness, no JVD. Pulm: Clear equal air entry no added sounds, no wheezing or rhonchi    CVS: S1 + S2, no murmur, no heave. Dorsalis pedis pulse palpable   Abd: soft lax, no tenderness, no organomegaly, audible bowel sounds. Skin: warm, no lesions, no rash.  No callus, no Ulcers, No acanthosis nigricans  Musculoskeletal: No back tenderness, no kyphosis/scoliosis    Neuro: CN intact, normal sensation bilateral , muscle power normal  Psych: normal mood, and affect      Review of Laboratory Data:  I personally reviewed the following lab:  Lab Results   Component Value Date/Time    WBC 6.3 11/11/2021 05:36 AM    RBC 3.52 11/11/2021 05:36 AM    HGB 9.9 (L) 11/11/2021 05:36 AM    HCT 29.9 (L) 11/11/2021 05:36 AM    MCV 84.9 11/11/2021 05:36 AM    MCH 28.1 11/11/2021 05:36 AM    MCHC 33.1 11/11/2021 05:36 AM    RDW 13.7 11/11/2021 05:36 AM     11/11/2021 05:36 AM    MPV 12.0 11/11/2021 05:36 AM      Lab Results   Component Value Date/Time     11/11/2021 05:36 AM    K 3.4 (L) 11/11/2021 05:36 AM    K 3.5 11/08/2021 05:45 PM    CO2 22 11/11/2021 05:36 AM    BUN 27 (H) 11/11/2021 05:36 AM    CREATININE 1.7 (H) 11/11/2021 05:36 AM    CALCIUM 8.0 (L) 11/11/2021 05:36 AM    LABGLOM 32 11/11/2021 05:36 AM    GFRAA 38 11/11/2021 05:36 AM      No results found for: TSH, T4FREE, L8KDIPH, FT3, V9FNMOA, TSI, TPOABS, THGAB  Lab Results   Component Value Date    LABA1C 8.5 12/01/2021    GLUCOSE 31 11/11/2021    MALBCR 118.3 11/09/2021    LABMICR 91.1 11/09/2021    LABCREA 78 11/09/2021    LABCREA 77 11/09/2021     Lab Results   Component Value Date    LABA1C 8.5 12/01/2021     No results found for: TRIG, HDL, LDLCALC, CHOL  Lab Results   Component Value Date    VITD25 41 11/10/2021       ASSESSMENT & RECOMMENDATIONS   Philip Akhtar, a 46 y.o.-old female seen in for the following issues       Assessment:      Diagnosis Orders   1. Type 1 diabetes mellitus without complication (HCC)  TSH without Reflex    T4, Free   2. Dietary noncompliance     3. Abnormal thyroid blood test  TSH without Reflex    T4, Free       Plan:     1. Type 1 diabetes mellitus without complication (HCC)   · Patient's diabetes is uncontrolled   · She uses [employer] insulin pump however she gives manual boluses and corrections. There are no settings for correction, carb ratio, or blood glucose target in the pump  · Insulin pump settings will be as follows:  · 0000 0.525, 3 AM 0.475, 4 AM 0.45, 6 AM 0.4 8 AM 0.475, 10 AM 0.5, 11:30 AM 0.475, 2:30 PM 0.55, carb ratio 1 unit for every 20 g of carbs, sensitivity 75, blood glucose target 100-1 30, active insulin time 6 hours  · He is interested in Sure T infusion set. Will have [employer] call to discuss  · I advised patient to use insulin pump as directed however she is very hesitant to do this in 1 would rather do it in her head per patient  · I advised for us to help with treatment and improving hemoglobin A1c she needs to use insulin pump as directed  · The patient was advised to check blood sugars 4 times a day before meals and at bedtime and send BS readings to our office in a week.   · Discussed with patient A1c and blood sugar goals   · Optimal blood sugars: 100-140 pre-prandial, < 180 peak post-prandial  · The patient counseled about the complications of uncontrolled diabetes   · Patient was counselled about the importance of self-blood glucose monitoring and eating consistent carb diet to avoid blood sugar fluctuations   · Discussed lifestyle changes including diet and exercise with patient; recommended 150 minutes of moderate intensity exercise per week. ·    2. Dietary noncompliance   Discussed with patient the importance of eating consistent carbohydrate meals, avoiding high glycemic index food. Also, discussed with patient the risk and negative consequences of dietary noncompliance on blood glucose control, blood pressure and weight   3. Abnormal thyroid blood test   Patient had mildly low TSH on recent blood work. She is taking natural thyroid supplement, biotin, and B complex vitamins. I advised patient to stop biotin and B12 complex vitamins as well as natural thyroid supplement for 1 week and then obtain TSH and free T4 thereafter       I personally spent > 60 minutes  Reviewing  external notes from PCP and other patient's care team providers, and personally interpreted labs associated with the above diagnosis. I also ordered labs to further assess and manage the above addressed medical conditions. Return in about 3 months (around 3/22/2022). The above issues were reviewed with the patient who understood and agreed with the plan. Thank you for allowing us to participate in the care of this patient. Please do not hesitate to contact us with any additional questions. MONIKA Peralta     [facility] Diabetes Care and Endocrinology   [address] [facility] [address]   Phone: [phone]  Fax: [phone]  --------------------------------------------  An electronic signature was used to authenticate this note. MONIKA Peralta   on 12/22/2021 at 1:25 PM

## 2022-03-25 LAB — T4 FREE: 12.6

## 2022-04-29 DIAGNOSIS — R79.89 ABNORMAL THYROID BLOOD TEST: ICD-10-CM

## 2022-04-29 DIAGNOSIS — E10.9 TYPE 1 DIABETES MELLITUS WITHOUT COMPLICATION (HCC): ICD-10-CM

## 2022-07-28 ENCOUNTER — OFFICE VISIT (OUTPATIENT)
Dept: ENDOCRINOLOGY | Age: 52
End: 2022-07-28
Payer: COMMERCIAL

## 2022-07-28 VITALS
SYSTOLIC BLOOD PRESSURE: 138 MMHG | BODY MASS INDEX: 20.43 KG/M2 | DIASTOLIC BLOOD PRESSURE: 72 MMHG | OXYGEN SATURATION: 100 % | WEIGHT: 111 LBS | HEART RATE: 89 BPM | HEIGHT: 62 IN

## 2022-07-28 DIAGNOSIS — Z91.119 DIETARY NONCOMPLIANCE: ICD-10-CM

## 2022-07-28 DIAGNOSIS — E10.9 TYPE 1 DIABETES MELLITUS WITHOUT COMPLICATION (HCC): Primary | ICD-10-CM

## 2022-07-28 DIAGNOSIS — R79.89 ABNORMAL THYROID BLOOD TEST: ICD-10-CM

## 2022-07-28 LAB — HBA1C MFR BLD: 7.6 %

## 2022-07-28 PROCEDURE — 3017F COLORECTAL CA SCREEN DOC REV: CPT | Performed by: CLINICAL NURSE SPECIALIST

## 2022-07-28 PROCEDURE — 83036 HEMOGLOBIN GLYCOSYLATED A1C: CPT | Performed by: CLINICAL NURSE SPECIALIST

## 2022-07-28 PROCEDURE — 2022F DILAT RTA XM EVC RTNOPTHY: CPT | Performed by: CLINICAL NURSE SPECIALIST

## 2022-07-28 PROCEDURE — 99214 OFFICE O/P EST MOD 30 MIN: CPT | Performed by: CLINICAL NURSE SPECIALIST

## 2022-07-28 PROCEDURE — 1036F TOBACCO NON-USER: CPT | Performed by: CLINICAL NURSE SPECIALIST

## 2022-07-28 PROCEDURE — 3051F HG A1C>EQUAL 7.0%<8.0%: CPT | Performed by: CLINICAL NURSE SPECIALIST

## 2022-07-28 PROCEDURE — G8427 DOCREV CUR MEDS BY ELIG CLIN: HCPCS | Performed by: CLINICAL NURSE SPECIALIST

## 2022-07-28 PROCEDURE — G8420 CALC BMI NORM PARAMETERS: HCPCS | Performed by: CLINICAL NURSE SPECIALIST

## 2022-07-28 RX ORDER — INSULIN LISPRO 100 [IU]/ML
INJECTION, SOLUTION INTRAVENOUS; SUBCUTANEOUS
COMMUNITY
End: 2022-09-12

## 2022-07-28 NOTE — PROGRESS NOTES
[address] [place] [address] Department of Endocrinology Diabetes and Metabolism   [address] [facility] [address]   Phone: [phone]  Fax: [phone]    Date of Service: 7/28/2022    Primary Care Physician: Sanam Yu DO  Referring physician: No ref. provider found  Provider: MONIKA Cote    Reason for the visit:  Type 1 DM       History of Present Illness: The history is provided by the patient. No  was used. Accuracy of the patient data is excellent. Willodean Pallas is a very pleasant 46 y.o. female seen today for diabetes management     Willodean Pallas was diagnosed with diabetes, type 1 at age 10  and currently on Novolog (Lispro ordered but has not started it as she is finshing her Novolog supply. She has been using a pump since 2000. She is currently using [employer] revel 723 . She is not entering her BS into the pump. She does not like to use the Bolus Wizard and computes it in her head. She wants to determine her own rates and does not want provider assistance. Insulin pump settings:  0000 0.525, 3 AM 0.5, 4 AM 0.45, 5 am 0.475,  630 am 0.55, 8 AM 0.575, 9 am 0.55 10 AM 0.525, 11:00 AM 0.45, 12 p, 0.425, 1230 0.4, 2:00 PM 0.475 3:30 pm 0.5, 4 pm 0.525, 830 PM 0.5, 1130 0.525, carb ratio 1 unit for every 20 g of carbs, sensitivity 75, blood glucose target 100-1 30, active insulin time 6 hours      The patient has been checking blood sugar  4+ times a day via fingerstick with a Contour Next Link 2.4 glucometer . Most recent A1c results summarized below  Lab Results   Component Value Date/Time    LABA1C 7.6 07/28/2022 02:43 PM    LABA1C 8.5 12/01/2021 12:00 AM        Patient reported hypoglycemic episodes.  Usually in the morning.  good hypoglycemic awareness    The patient has been mindful of what has been eating and following diabetic diet as encouraged  I reviewed current medications and the patient has no issues with diabetes medications  Elias Stanley is up to date with eye exam and denied any history of diabetic retinopathy    Last eye exam was early  . Has appt next month   The patient is not  seeing podiatrist.   And also performs  own feet care  Microvascular complications:  No Retinopathy,+ Nephropathy (presently presumed OMI in the setting of COVID due to 2021 hospital [facility]) or no Neuropathy   Macrovascular complications: no CAD, PVD, or Stroke   The patient refuses Flushot and pneumonia vaccine  Due to intolerance    PAST MEDICAL HISTORY   Past Medical History:   Diagnosis Date    DM (diabetes mellitus), type 1 ([address]        PAST SURGICAL HISTORY   Past Surgical History:   Procedure Laterality Date     SECTION         SOCIAL HISTORY   Tobacco:   reports that she has never smoked. She has never used smokeless tobacco.  Alcohol:   reports that she does not currently use alcohol. Drugs:   reports that she does not currently use drugs. FAMILY HISTORY   No family history on file. ALLERGIES AND DRUG REACTIONS   No Known Allergies    CURRENT MEDICATIONS   Current Outpatient Medications   Medication Sig Dispense Refill    insulin lispro (HUMALOG) 100 UNIT/ML SOLN injection vial Inject into the skin Use via insulin pump       No current facility-administered medications for this visit. Review of Systems  Constitutional: No fever, no chills, no diaphoresis, no generalized weakness. HEENT: No blurred vision, No sore throat, no ear pain, no hair loss  Neck: denied any neck swelling, difficulty swallowing,   Cardio-pulmonary: No CP, SOB or palpitation, No orthopnea or PND. No cough or wheezing. GI: No N/V/D, no constipation, No abdominal pain, no melena or hematochezia   : Denied any dysuria, hematuria, flank pain, discharge, or incontinence. Skin: denied any rash, ulcer, Hirsute, or hyperpigmentation. MSK: denied any joint deformity, joint pain/swelling, muscle pain, or back pain.   Neuro: no numbness, no tingling, no weakness    OBJECTIVE    /72   Pulse 89   Ht 5' 2\" (1.575 m)   Wt 111 lb (50.3 kg)   SpO2 100%   BMI 20.30 kg/m²   BP Readings from Last 4 Encounters:   07/28/22 138/72   12/22/21 (!) 162/74   11/11/21 128/75   10/23/20 125/77     Wt Readings from Last 6 Encounters:   07/28/22 111 lb (50.3 kg)   12/22/21 108 lb 3.2 oz (49.1 kg)   11/11/21 141 lb 4.8 oz (64.1 kg)   10/23/20 115 lb (52.2 kg)   10/09/20 117 lb (53.1 kg)   09/18/20 125 lb (56.7 kg)       Physical examination:  General: awake alert, oriented x3, no abnormal position or movements. HEENT: normocephalic non-traumatic, no exophthalmos   Neck: supple, no LN enlargement, no thyromegaly, no thyroid tenderness, no JVD. Pulm: Clear equal air entry no added sounds, no wheezing or rhonchi    CVS: S1 + S2, no murmur, no heave. Dorsalis pedis pulse palpable   Abd: soft lax, no tenderness, no organomegaly, audible bowel sounds. Skin: warm, no lesions, no rash.  No callus, no Ulcers, No acanthosis nigricans  Musculoskeletal: No back tenderness, no kyphosis/scoliosis    Neuro: CN intact, normal sensation bilateral , muscle power normal  Psych: normal mood, and affect      Review of Laboratory Data:  I personally reviewed the following lab:  Lab Results   Component Value Date/Time    WBC 6.3 11/11/2021 05:36 AM    RBC 3.52 11/11/2021 05:36 AM    HGB 9.9 (L) 11/11/2021 05:36 AM    HCT 29.9 (L) 11/11/2021 05:36 AM    MCV 84.9 11/11/2021 05:36 AM    MCH 28.1 11/11/2021 05:36 AM    MCHC 33.1 11/11/2021 05:36 AM    RDW 13.7 11/11/2021 05:36 AM     11/11/2021 05:36 AM    MPV 12.0 11/11/2021 05:36 AM      Lab Results   Component Value Date/Time     11/11/2021 05:36 AM    K 3.4 (L) 11/11/2021 05:36 AM    K 3.5 11/08/2021 05:45 PM    CO2 22 11/11/2021 05:36 AM    BUN 27 (H) 11/11/2021 05:36 AM    CREATININE 1.7 (H) 11/11/2021 05:36 AM    CALCIUM 8.0 (L) 11/11/2021 05:36 AM    LABGLOM 32 11/11/2021 05:36 AM    GFRAA 38 11/11/2021 05:36 AM Lab Results   Component Value Date/Time    T4FREE 12.6 03/25/2022 12:00 AM     Lab Results   Component Value Date/Time    LABA1C 7.6 07/28/2022 02:43 PM    GLUCOSE 31 11/11/2021 05:36 AM    MALBCR 118.3 11/09/2021 05:34 AM    LABMICR 91.1 11/09/2021 05:34 AM    LABCREA 78 11/09/2021 05:34 AM    LABCREA 77 11/09/2021 05:34 AM     Lab Results   Component Value Date/Time    LABA1C 7.6 07/28/2022 02:43 PM    LABA1C 8.5 12/01/2021 12:00 AM     No results found for: TRIG, HDL, LDLCALC, CHOL  Lab Results   Component Value Date/Time    VITD25 41 11/10/2021 04:57 AM       ASSESSMENT & RECOMMENDATIONS   Duke Soto, a 46 y.o.-old female seen in for the following issues       Assessment:      Diagnosis Orders   1. Type 1 diabetes mellitus without complication (HCC)  POCT glycosylated hemoglobin (Hb A1C)    Comprehensive Metabolic Panel    Lipid Panel    Microalbumin / Creatinine Urine Ratio      2. Dietary noncompliance        3. Abnormal thyroid blood test  TSH    T4, Free            Plan:     1. Type 1 diabetes mellitus without complication (HCC)   Patient's diabetes is variable. Hemoglobin A1c 7.6%  She has occasional hypoglycemia and she adjust basal rates on her own   she gives manual boluses and corrections. I had a long discussion with her at last office visit about using bolus wizard entering carbohydrates appropriately however she is not doing this  I advised patient to use insulin pump as directed however she is very hesitant to do this in 1 would rather do it in her head per patient  Continue insulin pump settings as above. See media for settings  The patient was advised to check blood sugars 4 times a day before meals and at bedtime and send BS readings to our office in a week.   Discussed with patient A1c and blood sugar goals   Optimal blood sugars: 100-140 pre-prandial, < 180 peak post-prandial  The patient counseled about the complications of uncontrolled diabetes   Patient was counselled about the importance of self-blood glucose monitoring and eating consistent carb diet to avoid blood sugar fluctuations   Discussed lifestyle changes including diet and exercise with patient; recommended 150 minutes of moderate intensity exercise per week. 2. Dietary noncompliance   Discussed with patient the importance of eating consistent carbohydrate meals, avoiding high glycemic index food. Also, discussed with patient the risk and negative consequences of dietary noncompliance on blood glucose control, blood pressure and weight   3. Abnormal thyroid blood test   Will reassess TFTs once patient hold biotin, B12 complex, and natural thyroid supplement for 1 week  Last total T4 within normal limits. TSH was mildly suppressed       I personally spent > 30 minutes  Reviewing  external notes from PCP and other patient's care team providers, and personally interpreted labs associated with the above diagnosis. I also ordered labs to further assess and manage the above addressed medical conditions. Return in about 3 months (around 10/28/2022). The above issues were reviewed with the patient who understood and agreed with the plan. Thank you for allowing us to participate in the care of this patient. Please do not hesitate to contact us with any additional questions. MONIKA Lugo     [facility] Diabetes Care and Endocrinology   [address] [place] [id number]   Phone: [phone]  Fax: [phone]  --------------------------------------------  An electronic signature was used to authenticate this note. MONIKA Lugo   on 7/28/2022 at 3:06 PM

## 2022-08-16 DIAGNOSIS — E10.9 TYPE 1 DIABETES MELLITUS WITHOUT COMPLICATION (HCC): Primary | ICD-10-CM

## 2022-08-17 RX ORDER — PERPHENAZINE 16 MG/1
TABLET, FILM COATED ORAL
Qty: 200 EACH | Refills: 5 | Status: SHIPPED | OUTPATIENT
Start: 2022-08-17

## 2022-08-29 ENCOUNTER — TELEPHONE (OUTPATIENT)
Dept: ENDOCRINOLOGY | Age: 52
End: 2022-08-29

## 2022-08-29 NOTE — TELEPHONE ENCOUNTER
A prior auth was submitted for the pt's Acc[facility]k Guide test strips [facility]

## 2022-09-12 DIAGNOSIS — Z96.41 PRESENCE OF INSULIN PUMP: ICD-10-CM

## 2022-09-12 DIAGNOSIS — E10.9 TYPE 1 DIABETES MELLITUS WITHOUT COMPLICATION (HCC): Primary | ICD-10-CM

## 2022-09-12 RX ORDER — INSULIN ASPART 100 [IU]/ML
INJECTION, SOLUTION INTRAVENOUS; SUBCUTANEOUS
Qty: 20 ML | Refills: 5 | Status: SHIPPED | OUTPATIENT
Start: 2022-09-12

## 2022-09-12 RX ORDER — PERPHENAZINE 16 MG/1
1 TABLET, FILM COATED ORAL 4 TIMES DAILY
Qty: 150 EACH | Refills: 5 | Status: SHIPPED | OUTPATIENT
Start: 2022-09-12

## 2022-09-12 NOTE — TELEPHONE ENCOUNTER
An [employer]ates was submitted for the pt's Contour Next test strip through [employer].

## 2022-12-12 ENCOUNTER — OFFICE VISIT (OUTPATIENT)
Dept: ENDOCRINOLOGY | Age: 52
End: 2022-12-12
Payer: COMMERCIAL

## 2022-12-12 VITALS
WEIGHT: 117 LBS | DIASTOLIC BLOOD PRESSURE: 82 MMHG | HEIGHT: 62 IN | RESPIRATION RATE: 16 BRPM | SYSTOLIC BLOOD PRESSURE: 137 MMHG | HEART RATE: 83 BPM | BODY MASS INDEX: 21.53 KG/M2

## 2022-12-12 DIAGNOSIS — Z91.119 DIETARY NONCOMPLIANCE: ICD-10-CM

## 2022-12-12 DIAGNOSIS — R79.89 ABNORMAL THYROID BLOOD TEST: ICD-10-CM

## 2022-12-12 DIAGNOSIS — Z96.41 PRESENCE OF INSULIN PUMP: ICD-10-CM

## 2022-12-12 DIAGNOSIS — E55.9 VITAMIN D DEFICIENCY: ICD-10-CM

## 2022-12-12 DIAGNOSIS — E10.65 TYPE 1 DIABETES MELLITUS WITH HYPERGLYCEMIA (HCC): Primary | ICD-10-CM

## 2022-12-12 LAB — HBA1C MFR BLD: 8.6 %

## 2022-12-12 PROCEDURE — 1036F TOBACCO NON-USER: CPT | Performed by: NURSE PRACTITIONER

## 2022-12-12 PROCEDURE — G8427 DOCREV CUR MEDS BY ELIG CLIN: HCPCS | Performed by: NURSE PRACTITIONER

## 2022-12-12 PROCEDURE — G8420 CALC BMI NORM PARAMETERS: HCPCS | Performed by: NURSE PRACTITIONER

## 2022-12-12 PROCEDURE — 83036 HEMOGLOBIN GLYCOSYLATED A1C: CPT | Performed by: NURSE PRACTITIONER

## 2022-12-12 PROCEDURE — 99214 OFFICE O/P EST MOD 30 MIN: CPT | Performed by: NURSE PRACTITIONER

## 2022-12-12 PROCEDURE — G8484 FLU IMMUNIZE NO ADMIN: HCPCS | Performed by: NURSE PRACTITIONER

## 2022-12-12 PROCEDURE — 2022F DILAT RTA XM EVC RTNOPTHY: CPT | Performed by: NURSE PRACTITIONER

## 2022-12-12 PROCEDURE — 3017F COLORECTAL CA SCREEN DOC REV: CPT | Performed by: NURSE PRACTITIONER

## 2022-12-12 PROCEDURE — 3052F HG A1C>EQUAL 8.0%<EQUAL 9.0%: CPT | Performed by: NURSE PRACTITIONER

## 2022-12-12 RX ORDER — INSULIN ASPART 100 [IU]/ML
INJECTION, SOLUTION INTRAVENOUS; SUBCUTANEOUS
Qty: 20 ML | Refills: 5 | Status: SHIPPED | OUTPATIENT
Start: 2022-12-12

## 2022-12-12 NOTE — PROGRESS NOTES
[address] [facility] Department of Endocrinology Diabetes and Metabolism   [address] [facility] [address]   Phone: [phone]  Fax: [phone]    Date of Service: 12/15/2022    Primary Care Physician: Parviz Moss DO  Referring physician: No ref. provider found  Provider: MONIKA Duran NP    Reason for the visit:  Type 1 DM       History of Present Illness: The history is provided by the patient. No  was used. Accuracy of the patient data is excellent. Collette Hansen is a very pleasant 46 y.o. female seen today for diabetes management     Collette Hansen was diagnosed with diabetes, type 1 at age 10  and currently on Novolog (Lispro ordered but has not started it as she is finshing her Novolog supply. She has been using a pump since 2000. She is currently using [employer] revel 723 . She is not entering her BS into the pump. Manual boluses are given without checking BS  She does not like to use the Bolus Wizard and computes it in her head. She wants to determine her own rates and does not want provider assistance. Insulin pump settings:  Basal 0000 0.525, 3 AM 0.525, 3 AM 0.50,  4 am 0.450, 0430am 0.425, 0500 am 0.475,  630 am 0.55, 8 AM 0.575, 0830 0.575, 9 am 0.55, 0930 0.55,  10 AM 0.525, 11:00 AM 0.45, 1130 0.450, 12 p, 0.425, 1230 0.375, 1pm 0.375, 2:00 PM 0.475, 230pm 0.475,  3:30 pm 0.5, 4 pm 0.525, 530pm 0.525, 7pm 0.525, 830 PM 0.5, 9pm 0.475, 1030pm 0.50, 1130 0.525, carb ratio  15 , sensitivity 100, blood glucose target , active insulin time 6 hours    The patient has been checking blood sugar infrequently via  fingerstick with a Contour Next Link 2.4 glucometer     Glucometer x 2 reviewed - many days with missing dates - States she misses her BS checks frequently.     She is not interested in a CGM as she says she is \"too tiny\" to wear a CGM    Most recent A1c results summarized below  Lab Results   Component Value Date/Time    LABA1C 8.6 2022 03:06 PM    LABA1C 7.6 2022 02:43 PM    LABA1C 8.5 2021 12:00 AM        Patient reported hypoglycemic episodes. Usually in the morning. Good hypoglycemic awareness. The patient has been mindful of what has been eating and following diabetic diet as encouraged  I reviewed current medications and the patient has no issues with diabetes medications  Chary Irby is up to date with eye exam and denied any history of diabetic retinopathy   Last eye exam was   The patient is not  seeing podiatrist.   And also performs  own feet care  Microvascular complications:  No Retinopathy,+ Nephropathy (presently presumed OMI in the setting of COVID due to 2021 [facility]) or no Neuropathy   Macrovascular complications: no CAD, PVD, or Stroke   The patient refuses Flushot and pneumonia vaccine  Due to intolerance    PAST MEDICAL HISTORY   Past Medical History:   Diagnosis Date    DM (diabetes mellitus), type 1 ([address]        PAST SURGICAL HISTORY   Past Surgical History:   Procedure Laterality Date     SECTION         SOCIAL HISTORY   Tobacco:   reports that she has never smoked. She has never used smokeless tobacco.  Alcohol:   reports that she does not currently use alcohol. Drugs:   reports that she does not currently use drugs. FAMILY HISTORY   No family history on file. ALLERGIES AND DRUG REACTIONS   No Known Allergies    CURRENT MEDICATIONS   Current Outpatient Medications   Medication Sig Dispense Refill    insulin aspart (NOVOLOG) 100 UNIT/ML injection vial 50 units daily via insulin pump 20 mL 5    blood glucose test strips (CONTOUR NEXT TEST) strip 1 each by In Vitro route in the morning, at noon, in the evening, and at bedtime As needed. 150 each 5    blood glucose test strips (CONTOUR NEXT TEST) strip Check blood glucose 4x daily 200 each 5     No current facility-administered medications for this visit.        Review of Systems  Constitutional: No fever, no chills, no diaphoresis, no generalized weakness. HEENT: No blurred vision, No sore throat, no ear pain, no hair loss  Neck: denied any neck swelling, difficulty swallowing,   Cardio-pulmonary: No CP, SOB or palpitation, No orthopnea or PND. No cough or wheezing. GI: No N/V/D, no constipation, No abdominal pain, no melena or hematochezia   : Denied any dysuria, hematuria, flank pain, discharge, or incontinence. Skin: denied any rash, ulcer, Hirsute, or hyperpigmentation. MSK: denied any joint deformity, joint pain/swelling, muscle pain, or back pain. Neuro: no numbness, no tingling, no weakness    OBJECTIVE    /82   Pulse 83   Resp 16   Ht 5' 2\" (1.575 m)   Wt 117 lb (53.1 kg)   BMI 21.40 kg/m²   BP Readings from Last 4 Encounters:   12/12/22 137/82   07/28/22 138/72   12/22/21 (!) 162/74   11/11/21 128/75     Wt Readings from Last 6 Encounters:   12/12/22 117 lb (53.1 kg)   07/28/22 111 lb (50.3 kg)   12/22/21 108 lb 3.2 oz (49.1 kg)   11/11/21 141 lb 4.8 oz (64.1 kg)   10/23/20 115 lb (52.2 kg)   10/09/20 117 lb (53.1 kg)       Physical examination:  General: awake alert, oriented x3, no abnormal position or movements. HEENT: normocephalic non-traumatic, no exophthalmos   Neck: supple, no LN enlargement, no thyromegaly, no thyroid tenderness, no JVD. Pulm: Clear equal air entry no added sounds, no wheezing or rhonchi    CVS: S1 + S2, no murmur, no heave. Dorsalis pedis pulse palpable   Abd: soft lax, no tenderness, no organomegaly, audible bowel sounds. Skin: warm, no lesions, no rash.  No callus, no Ulcers, No acanthosis nigricans  Musculoskeletal: No back tenderness, no kyphosis/scoliosis    Neuro: CN intact, normal sensation bilateral , muscle power normal  Psych: normal mood, and affect      Review of Laboratory Data:  I personally reviewed the following lab:  Lab Results   Component Value Date/Time    WBC 6.3 11/11/2021 05:36 AM    RBC 3.52 11/11/2021 05:36 AM    HGB 9.9 (L) 11/11/2021 05:36 AM    HCT 29.9 (L) 11/11/2021 05:36 AM    MCV 84.9 11/11/2021 05:36 AM    MCH 28.1 11/11/2021 05:36 AM    MCHC 33.1 11/11/2021 05:36 AM    RDW 13.7 11/11/2021 05:36 AM     11/11/2021 05:36 AM    MPV 12.0 11/11/2021 05:36 AM      Lab Results   Component Value Date/Time     11/11/2021 05:36 AM    K 3.4 (L) 11/11/2021 05:36 AM    K 3.5 11/08/2021 05:45 PM    CO2 22 11/11/2021 05:36 AM    BUN 27 (H) 11/11/2021 05:36 AM    CREATININE 1.7 (H) 11/11/2021 05:36 AM    CALCIUM 8.0 (L) 11/11/2021 05:36 AM    LABGLOM 32 11/11/2021 05:36 AM    GFRAA 38 11/11/2021 05:36 AM      Lab Results   Component Value Date/Time    T4FREE 12.6 03/25/2022 12:00 AM     Lab Results   Component Value Date/Time    LABA1C 8.6 12/12/2022 03:06 PM    GLUCOSE 31 11/11/2021 05:36 AM    MALBCR 118.3 11/09/2021 05:34 AM    LABMICR 91.1 11/09/2021 05:34 AM    LABCREA 78 11/09/2021 05:34 AM    LABCREA 77 11/09/2021 05:34 AM     Lab Results   Component Value Date/Time    LABA1C 8.6 12/12/2022 03:06 PM    LABA1C 7.6 07/28/2022 02:43 PM    LABA1C 8.5 12/01/2021 12:00 AM     No results found for: TRIG, HDL, LDLCALC, CHOL  Lab Results   Component Value Date/Time    VITD25 41 11/10/2021 04:57 AM       ASSESSMENT & RECOMMENDATIONS   Rodney Jessica, a 46 y.o.-old female seen in for the following issues       Assessment:      Diagnosis Orders   1. Type 1 diabetes mellitus with hyperglycemia (HCC)  POCT glycosylated hemoglobin (Hb A1C)    CBC    Basic Metabolic Panel    LIPID PANEL    MICROALBUMIN / CREATININE URINE RATIO    Vitamin D 25 Hydroxy    TSH    T4, Free    insulin aspart (NOVOLOG) 100 UNIT/ML injection vial      2. Presence of insulin pump        3. Dietary noncompliance        4. Abnormal thyroid blood test        5. Vitamin D deficiency              Plan:     1. Type 1 diabetes mellitus with hyperglycemia (HCC)   Patient's diabetes is variable.   Hemoglobin worsened A1c 8.6%  She has occasional hypoglycemia and she adjust basal rates on her own  She gives manual boluses and correction without checking her BS. I had a long discussion with her at last office visit about using bolus wizard entering carbohydrates appropriately however she is not doing this. She is adamant not to use the pump calculations  I advised patient to use insulin pump as directed however she is very hesitant to do this  Continue insulin pump settings as above. See media for settings  The patient was advised to check blood sugars 4 times a day before meals and at bedtime and send BS readings to our office in a week. Discussed with patient A1c and blood sugar goals   Optimal blood sugars: 100-140 pre-prandial, < 180 peak post-prandial  The patient counseled about the complications of uncontrolled diabetes   Patient was counselled about the importance of self-blood glucose monitoring and eating consistent carb diet to avoid blood sugar fluctuations   Discussed lifestyle changes including diet and exercise with patient  A1c at next OV     Continuous Glucose Monitoring (CGM) download and interpretation   I personally reviewed and interpreted continuous glucose monitor (CGM) download. CGM report was discussed with patient including blood glucose patterns, percentages of blood glucose at goal, above goal and below goal. Insulin dosages/antidiabetic regimen was adjusted according to CGM download. Full CGM was scanned under media. 2.            3. Abnormal thyroid blood test   Will reassess TFTs once patient hold biotin, B12 complex, and natural thyroid supplement for 1 week  Last total T4 within normal limits. TSH was mildly suppressed      Vitamin D deficiency  Will check surveillance level         I personally spent > 30 minutes  Reviewing  external notes from PCP and other patient's care team providers, and personally interpreted labs associated with the above diagnosis.  I also ordered labs to further assess and manage the above addressed medical conditions. Return in about 3 months (around 3/12/2023) for Type 1 DM . The above issues were reviewed with the patient who understood and agreed with the plan. Thank you for allowing us to participate in the care of this patient. Please do not hesitate to contact us with any additional questions. MONIKA Gleason NP    [facility] Diabetes Care and Endocrinology   [address] [place] [address]   Phone: [phone]  Fax: [phone]  --------------------------------------------  An electronic signature was used to authenticate this note. MONIKA Gleason NP on 12/15/2022 at 3:09 PM

## 2023-04-18 DIAGNOSIS — E10.65 TYPE 1 DIABETES MELLITUS WITH HYPERGLYCEMIA (HCC): ICD-10-CM

## 2023-04-18 DIAGNOSIS — E10.9 TYPE 1 DIABETES MELLITUS WITHOUT COMPLICATION (HCC): ICD-10-CM

## 2023-04-18 DIAGNOSIS — Z96.41 PRESENCE OF INSULIN PUMP: ICD-10-CM

## 2023-04-19 RX ORDER — INSULIN ASPART 100 [IU]/ML
INJECTION, SOLUTION INTRAVENOUS; SUBCUTANEOUS
Qty: 20 ML | Refills: 0 | Status: SHIPPED | OUTPATIENT
Start: 2023-04-19

## 2023-04-19 RX ORDER — PERPHENAZINE 16 MG/1
1 TABLET, FILM COATED ORAL 4 TIMES DAILY
Qty: 200 EACH | Refills: 11 | Status: SHIPPED | OUTPATIENT
Start: 2023-04-19

## 2023-05-02 ENCOUNTER — TELEPHONE (OUTPATIENT)
Dept: ENDOCRINOLOGY | Age: 53
End: 2023-05-02

## 2023-05-11 ENCOUNTER — TELEPHONE (OUTPATIENT)
Dept: ADMINISTRATIVE | Age: 53
End: 2023-05-11

## 2023-05-11 NOTE — TELEPHONE ENCOUNTER
Pt has had no test strips since 2-22-23. Pt states she has tried several times to talk to office, but still has not received her strips. Pt cannot afford to buy strips on her own. Office staff was unavailable due to pt care. Please call pt back. Thanks!

## 2023-05-11 NOTE — TELEPHONE ENCOUNTER
Spoke to patient and she stated that she has a letter of approval from her insurance from last year that is good until august of 2023. Spoke with my supervisor and was informed that this is an approval from her insurance before it changed so it is no longer valid. Tried to call pt back again and no answer. All PA have been denied, pt will need to test and enter reading manually into pump.

## 2023-06-13 LAB
BASOPHILS ABSOLUTE: NORMAL
BASOPHILS RELATIVE PERCENT: NORMAL
BUN BLDV-MCNC: NORMAL MG/DL
CALCIUM SERPL-MCNC: 9.5 MG/DL
CHLORIDE BLD-SCNC: 109 MMOL/L
CHOLESTEROL, TOTAL: 221 MG/DL
CHOLESTEROL/HDL RATIO: ABNORMAL
CO2: NORMAL
CREAT SERPL-MCNC: 0.9 MG/DL
CREATININE, URINE: 69.3
EGFR: NORMAL
EOSINOPHILS ABSOLUTE: NORMAL
EOSINOPHILS RELATIVE PERCENT: NORMAL
GLUCOSE BLD-MCNC: NORMAL MG/DL
HCT VFR BLD CALC: 43 % (ref 36–46)
HDLC SERPL-MCNC: 113 MG/DL (ref 35–70)
HEMOGLOBIN: 13.9 G/DL (ref 12–16)
LDL CHOLESTEROL CALCULATED: 94 MG/DL (ref 0–160)
LYMPHOCYTES ABSOLUTE: NORMAL
LYMPHOCYTES RELATIVE PERCENT: NORMAL
MCH RBC QN AUTO: NORMAL PG
MCHC RBC AUTO-ENTMCNC: NORMAL G/DL
MCV RBC AUTO: NORMAL FL
MICROALBUMIN/CREAT 24H UR: 1.5 MG/G{CREAT}
MICROALBUMIN/CREAT UR-RTO: NORMAL
MONOCYTES ABSOLUTE: NORMAL
MONOCYTES RELATIVE PERCENT: NORMAL
NEUTROPHILS ABSOLUTE: NORMAL
NEUTROPHILS RELATIVE PERCENT: NORMAL
NONHDLC SERPL-MCNC: ABNORMAL MG/DL
PLATELET # BLD: NORMAL 10*3/UL
PMV BLD AUTO: NORMAL FL
POTASSIUM SERPL-SCNC: 3.8 MMOL/L
RBC # BLD: NORMAL 10*6/UL
SODIUM BLD-SCNC: 140 MMOL/L
T4 FREE: 1.21
TRIGL SERPL-MCNC: 69 MG/DL
TSH SERPL DL<=0.05 MIU/L-ACNC: 1.67 UIU/ML
VITAMIN D 25-HYDROXY: 51.2
VITAMIN D2, 25 HYDROXY: NORMAL
VITAMIN D3,25 HYDROXY: NORMAL
VLDLC SERPL CALC-MCNC: ABNORMAL MG/DL
WBC # BLD: 7.6 10^3/ML

## 2023-06-19 DIAGNOSIS — E10.65 TYPE 1 DIABETES MELLITUS WITH HYPERGLYCEMIA (HCC): ICD-10-CM

## 2023-06-20 ENCOUNTER — TELEPHONE (OUTPATIENT)
Dept: ENDOCRINOLOGY | Age: 53
End: 2023-06-20

## 2023-06-20 NOTE — TELEPHONE ENCOUNTER
----- Message from MONIKA Zambrano NP sent at 6/19/2023  2:49 PM EDT -----  Has an appt 6/26 at our office will review at that time with pt .

## 2023-06-26 ENCOUNTER — OFFICE VISIT (OUTPATIENT)
Dept: ENDOCRINOLOGY | Age: 53
End: 2023-06-26

## 2023-06-26 VITALS
DIASTOLIC BLOOD PRESSURE: 77 MMHG | BODY MASS INDEX: 21.16 KG/M2 | HEART RATE: 90 BPM | SYSTOLIC BLOOD PRESSURE: 146 MMHG | OXYGEN SATURATION: 99 % | WEIGHT: 115 LBS | HEIGHT: 62 IN

## 2023-06-26 DIAGNOSIS — E10.9 TYPE 1 DIABETES MELLITUS WITHOUT COMPLICATION (HCC): Primary | ICD-10-CM

## 2023-06-26 DIAGNOSIS — E10.65 TYPE 1 DIABETES MELLITUS WITH HYPERGLYCEMIA (HCC): ICD-10-CM

## 2023-06-26 DIAGNOSIS — Z91.119 DIETARY NONCOMPLIANCE: ICD-10-CM

## 2023-06-26 DIAGNOSIS — E55.9 VITAMIN D DEFICIENCY: ICD-10-CM

## 2023-06-26 LAB — HBA1C MFR BLD: 8.3 %

## 2023-06-26 RX ORDER — LANCETS 33 GAUGE
EACH MISCELLANEOUS
Qty: 250 EACH | Refills: 3 | Status: SHIPPED | OUTPATIENT
Start: 2023-06-26

## 2023-06-26 RX ORDER — INSULIN ASPART 100 [IU]/ML
INJECTION, SOLUTION INTRAVENOUS; SUBCUTANEOUS
Qty: 20 ML | Refills: 11 | Status: SHIPPED | OUTPATIENT
Start: 2023-06-26

## 2023-06-26 RX ORDER — GLUCOSAMINE HCL/CHONDROITIN SU 500-400 MG
CAPSULE ORAL
Qty: 250 STRIP | Refills: 5 | Status: SHIPPED | OUTPATIENT
Start: 2023-06-26

## 2023-06-29 PROBLEM — E55.9 VITAMIN D DEFICIENCY: Status: ACTIVE | Noted: 2023-06-29

## 2023-06-29 PROBLEM — Z91.119 DIETARY NONCOMPLIANCE: Status: ACTIVE | Noted: 2023-06-29

## 2023-07-14 PROCEDURE — 99283 EMERGENCY DEPT VISIT LOW MDM: CPT

## 2023-07-14 ASSESSMENT — PAIN SCALES - GENERAL: PAINLEVEL_OUTOF10: 6

## 2023-07-14 ASSESSMENT — LIFESTYLE VARIABLES: HOW MANY STANDARD DRINKS CONTAINING ALCOHOL DO YOU HAVE ON A TYPICAL DAY: PATIENT DOES NOT DRINK

## 2023-07-14 ASSESSMENT — PAIN - FUNCTIONAL ASSESSMENT: PAIN_FUNCTIONAL_ASSESSMENT: 0-10

## 2023-07-15 ENCOUNTER — HOSPITAL ENCOUNTER (EMERGENCY)
Age: 53
Discharge: HOME | End: 2023-07-15
Attending: EMERGENCY MEDICINE
Payer: COMMERCIAL

## 2023-07-15 ENCOUNTER — APPOINTMENT (OUTPATIENT)
Dept: GENERAL RADIOLOGY | Age: 53
End: 2023-07-15
Payer: COMMERCIAL

## 2023-07-15 VITALS
TEMPERATURE: 97.3 F | WEIGHT: 112 LBS | DIASTOLIC BLOOD PRESSURE: 60 MMHG | RESPIRATION RATE: 16 BRPM | BODY MASS INDEX: 20.49 KG/M2 | OXYGEN SATURATION: 99 % | SYSTOLIC BLOOD PRESSURE: 112 MMHG | HEART RATE: 78 BPM

## 2023-07-15 DIAGNOSIS — S93.602A FOOT SPRAIN, LEFT, INITIAL ENCOUNTER: Primary | ICD-10-CM

## 2023-07-15 PROCEDURE — 73630 X-RAY EXAM OF FOOT: CPT

## 2023-07-15 RX ORDER — IBUPROFEN 800 MG/1
800 TABLET ORAL ONCE
Status: DISCONTINUED | OUTPATIENT
Start: 2023-07-15 | End: 2023-07-15 | Stop reason: HOSPADM

## 2023-07-15 ASSESSMENT — ENCOUNTER SYMPTOMS
SINUS PRESSURE: 0
VOMITING: 0
ABDOMINAL DISTENTION: 0
COUGH: 0
EYE PAIN: 0
SHORTNESS OF BREATH: 0
BACK PAIN: 0
EYE DISCHARGE: 0
SORE THROAT: 0
EYE REDNESS: 0
DIARRHEA: 0
NAUSEA: 0
WHEEZING: 0

## 2023-07-15 NOTE — ED NOTES
Ace wrap and post op shoe applied to left foot; patient states she is unable to put pressure on the bottom of her foot and that she is having cramping up her leg; physician updated     Myla Ruiz RN  07/15/23 [id number]       Myla Ruiz RN  07/15/23 1939

## 2023-07-25 ENCOUNTER — TELEPHONE (OUTPATIENT)
Dept: ENDOCRINOLOGY | Age: 53
End: 2023-07-25

## 2023-07-25 NOTE — TELEPHONE ENCOUNTER
Pt called and LM yesterday to have paperwork filled out. Called pt back and LM advising her to fax or bring in paperwork. (Stated bringing it in would be better).

## 2023-08-17 ENCOUNTER — TELEPHONE (OUTPATIENT)
Dept: ENDOCRINOLOGY | Age: 53
End: 2023-08-17

## 2023-08-17 NOTE — TELEPHONE ENCOUNTER
The pt left a message on  the clinical line. She wanted to know if we were receiving certain paperwork. I returned call with LAURA SALINAS to call back

## 2023-08-31 ENCOUNTER — TELEPHONE (OUTPATIENT)
Dept: ENDOCRINOLOGY | Age: 53
End: 2023-08-31

## 2023-08-31 NOTE — TELEPHONE ENCOUNTER
Patient call left a voicemail about an eye exam on some paperwork and we requested a new form to correct that section of the paperwork . Left voicemail about giving us a new form or faxing one in fax number left.

## 2023-11-16 ENCOUNTER — OFFICE VISIT (OUTPATIENT)
Dept: ENDOCRINOLOGY | Age: 53
End: 2023-11-16
Payer: COMMERCIAL

## 2023-11-16 VITALS
WEIGHT: 112 LBS | HEART RATE: 76 BPM | HEIGHT: 62 IN | DIASTOLIC BLOOD PRESSURE: 89 MMHG | BODY MASS INDEX: 20.61 KG/M2 | SYSTOLIC BLOOD PRESSURE: 153 MMHG

## 2023-11-16 DIAGNOSIS — E10.9 TYPE 1 DIABETES MELLITUS WITHOUT COMPLICATION (HCC): Primary | ICD-10-CM

## 2023-11-16 DIAGNOSIS — Z96.41 PRESENCE OF INSULIN PUMP: ICD-10-CM

## 2023-11-16 DIAGNOSIS — Z91.119 DIETARY NONCOMPLIANCE: ICD-10-CM

## 2023-11-16 LAB — HBA1C MFR BLD: 6.5 %

## 2023-11-16 PROCEDURE — 83036 HEMOGLOBIN GLYCOSYLATED A1C: CPT | Performed by: CLINICAL NURSE SPECIALIST

## 2023-11-16 PROCEDURE — G8484 FLU IMMUNIZE NO ADMIN: HCPCS | Performed by: CLINICAL NURSE SPECIALIST

## 2023-11-16 PROCEDURE — G8427 DOCREV CUR MEDS BY ELIG CLIN: HCPCS | Performed by: CLINICAL NURSE SPECIALIST

## 2023-11-16 PROCEDURE — G8420 CALC BMI NORM PARAMETERS: HCPCS | Performed by: CLINICAL NURSE SPECIALIST

## 2023-11-16 PROCEDURE — 3044F HG A1C LEVEL LT 7.0%: CPT | Performed by: CLINICAL NURSE SPECIALIST

## 2023-11-16 PROCEDURE — 3017F COLORECTAL CA SCREEN DOC REV: CPT | Performed by: CLINICAL NURSE SPECIALIST

## 2023-11-16 PROCEDURE — 2022F DILAT RTA XM EVC RTNOPTHY: CPT | Performed by: CLINICAL NURSE SPECIALIST

## 2023-11-16 PROCEDURE — 1036F TOBACCO NON-USER: CPT | Performed by: CLINICAL NURSE SPECIALIST

## 2023-11-16 PROCEDURE — 99214 OFFICE O/P EST MOD 30 MIN: CPT | Performed by: CLINICAL NURSE SPECIALIST

## 2023-11-16 NOTE — PROGRESS NOTES
[address] [facility] Department of Endocrinology Diabetes and Metabolism   [address] [facility] [address] [place] [address], [facility]   Phone: [phone]  Fax: [phone]    Date of Service: 11/16/2023    Primary Care Physician: Negrito Banks DO  Referring physician: No ref. provider found  Provider: MONIKA Bill    Reason for the visit:  Type 1 DM       History of Present Illness: The history is provided by the patient. No  was used. Accuracy of the patient data is excellent. Richard Avila is a very pleasant 48 y.o. female seen today for diabetes management     Richard Avila was diagnosed with diabetes, type 1 at age 10  and currently on Novolog (Lispro ordered but has not started it as she is finshing her Novolog supply. She has been using a pump since 2000. She is currently using [employer]tr[employer] revel 723 . She is not entering her BS into the pump. She does not like to use the Bolus Wizard and computes it in her head. She wants to determine her own rates and does not want provider assistance. Insulin pump settings:  Please see scanned pump download         The patient has been checking blood sugar  4+ times a day via fingerstick with a Contour Next Link 2.4 glucometer . Most recent A1c results summarized below  Lab Results   Component Value Date/Time    LABA1C 6.5 11/16/2023 03:37 PM    LABA1C 8.3 06/26/2023 02:26 PM    LABA1C 8.6 12/12/2022 03:06 PM        Patient reported hypoglycemic episodes.  Usually in the morning.  good hypoglycemic awareness    The patient has been mindful of what has been eating and following diabetic diet as encouraged  I reviewed current medications and the patient has no issues with diabetes medications  Richard Avila is up to date with eye exam and denied any history of diabetic retinopathy    Last eye exam was early  2022  The patient is not  seeing podiatrist.   And also performs  own feet care  Microvascular complications:

## 2024-03-20 ENCOUNTER — OFFICE VISIT (OUTPATIENT)
Dept: ENDOCRINOLOGY | Age: 54
End: 2024-03-20
Payer: COMMERCIAL

## 2024-03-20 VITALS
HEART RATE: 105 BPM | RESPIRATION RATE: 18 BRPM | BODY MASS INDEX: 21.71 KG/M2 | OXYGEN SATURATION: 99 % | SYSTOLIC BLOOD PRESSURE: 142 MMHG | HEIGHT: 62 IN | WEIGHT: 118 LBS | DIASTOLIC BLOOD PRESSURE: 82 MMHG

## 2024-03-20 DIAGNOSIS — E55.9 VITAMIN D DEFICIENCY: ICD-10-CM

## 2024-03-20 DIAGNOSIS — R79.89 ABNORMAL THYROID BLOOD TEST: ICD-10-CM

## 2024-03-20 DIAGNOSIS — E10.9 TYPE 1 DIABETES MELLITUS WITHOUT COMPLICATION (HCC): Primary | ICD-10-CM

## 2024-03-20 DIAGNOSIS — Z96.41 PRESENCE OF INSULIN PUMP: ICD-10-CM

## 2024-03-20 LAB — HBA1C MFR BLD: 6.9 %

## 2024-03-20 PROCEDURE — 83036 HEMOGLOBIN GLYCOSYLATED A1C: CPT | Performed by: INTERNAL MEDICINE

## 2024-03-20 PROCEDURE — 3017F COLORECTAL CA SCREEN DOC REV: CPT | Performed by: INTERNAL MEDICINE

## 2024-03-20 PROCEDURE — 99214 OFFICE O/P EST MOD 30 MIN: CPT | Performed by: INTERNAL MEDICINE

## 2024-03-20 PROCEDURE — G8420 CALC BMI NORM PARAMETERS: HCPCS | Performed by: INTERNAL MEDICINE

## 2024-03-20 PROCEDURE — 3044F HG A1C LEVEL LT 7.0%: CPT | Performed by: INTERNAL MEDICINE

## 2024-03-20 PROCEDURE — G8484 FLU IMMUNIZE NO ADMIN: HCPCS | Performed by: INTERNAL MEDICINE

## 2024-03-20 PROCEDURE — G8427 DOCREV CUR MEDS BY ELIG CLIN: HCPCS | Performed by: INTERNAL MEDICINE

## 2024-03-20 PROCEDURE — 1036F TOBACCO NON-USER: CPT | Performed by: INTERNAL MEDICINE

## 2024-03-20 PROCEDURE — 2022F DILAT RTA XM EVC RTNOPTHY: CPT | Performed by: INTERNAL MEDICINE

## 2024-03-20 RX ORDER — INSULIN ASPART 100 [IU]/ML
INJECTION, SOLUTION INTRAVENOUS; SUBCUTANEOUS
Qty: 20 ML | Refills: 11 | Status: SHIPPED | OUTPATIENT
Start: 2024-03-20

## 2024-03-20 NOTE — PROGRESS NOTES
[facility] Department of Endocrinology Diabetes and Metabolism   [address] [place] [address]   Phone: [phone]  Fax: [phone]    Date of Service: 3/20/2024  Primary Care Physician: Keith Asencio DO  Provider: Roque Yip MD    Reason for the visit:  Type 1 DM       History of Present Illness:  The history is provided by the patient. No  was used. Accuracy of the patient data is excellent.  Reema Samuels is a very pleasant 53 y.o. female seen today for diabetes management     Reema Samuels was diagnosed with diabetes, type 1 at age 6  and currently on Novolog (Lispro ordered but has not started it as she is finshing her Novolog supply. She has been using a pump since 2000. She is currently using [employer] revel 723 .   Manual boluses are given without checking BS  She does not like to use the Bolus Wizard and computes it in her head.   She wants to determine her own rates and does not want provider assistance.     Insulin pump settings:  Basal 0000 0.525, 3 AM 0.525, 3 AM 0.50,  4 am 0.450, 0430am 0.425, 0500 am 0.475,  630 am 0.55, 8 AM 0.575, 0830 0.575, 9 am 0.55, 0930 0.55,  10 AM 0.525, 11:00 AM 0.45, 1130 0.450, 12 p, 0.425, 1230 0.375, 1pm 0.375, 2:00 PM 0.475, 230pm 0.475,  3:30 pm 0.5, 4 pm 0.525, 530pm 0.525, 7pm 0.525, 830 PM 0.5, 9pm 0.475, 1030pm 0.50, 1130 0.525, carb ratio  15 , sensitivity 100, blood glucose target , active insulin time 6 hours    The patient has been checking blood sugar infrequently via  fingerstick with a Contour Next Link 2.4 glucometer     Glucometer x 2 reviewed - many days with missing dates - States she misses her BS checks frequently.    She is not interested in a CGM as she says she is \"too tiny\" to wear a CGM    Most recent A1c results summarized below  Lab Results   Component Value Date/Time    LABA1C 6.9 03/20/2024 01:23 PM    LABA1C 6.5 11/16/2023 03:37 PM    LABA1C 8.3 06/26/2023

## 2024-05-30 LAB — DIABETIC RETINOPATHY: NEGATIVE

## 2024-07-30 ENCOUNTER — OFFICE VISIT (OUTPATIENT)
Dept: ENDOCRINOLOGY | Age: 54
End: 2024-07-30
Payer: COMMERCIAL

## 2024-07-30 VITALS
OXYGEN SATURATION: 96 % | HEIGHT: 62 IN | DIASTOLIC BLOOD PRESSURE: 80 MMHG | BODY MASS INDEX: 21.9 KG/M2 | SYSTOLIC BLOOD PRESSURE: 143 MMHG | WEIGHT: 119 LBS | HEART RATE: 80 BPM

## 2024-07-30 DIAGNOSIS — E10.9 TYPE 1 DIABETES MELLITUS WITHOUT COMPLICATION (HCC): Primary | ICD-10-CM

## 2024-07-30 DIAGNOSIS — E10.65 TYPE 1 DIABETES MELLITUS WITH HYPERGLYCEMIA (HCC): ICD-10-CM

## 2024-07-30 DIAGNOSIS — Z96.41 PRESENCE OF INSULIN PUMP: ICD-10-CM

## 2024-07-30 PROCEDURE — 1036F TOBACCO NON-USER: CPT | Performed by: CLINICAL NURSE SPECIALIST

## 2024-07-30 PROCEDURE — 99214 OFFICE O/P EST MOD 30 MIN: CPT | Performed by: CLINICAL NURSE SPECIALIST

## 2024-07-30 PROCEDURE — G8420 CALC BMI NORM PARAMETERS: HCPCS | Performed by: CLINICAL NURSE SPECIALIST

## 2024-07-30 PROCEDURE — 2022F DILAT RTA XM EVC RTNOPTHY: CPT | Performed by: CLINICAL NURSE SPECIALIST

## 2024-07-30 PROCEDURE — G8427 DOCREV CUR MEDS BY ELIG CLIN: HCPCS | Performed by: CLINICAL NURSE SPECIALIST

## 2024-07-30 PROCEDURE — 3044F HG A1C LEVEL LT 7.0%: CPT | Performed by: CLINICAL NURSE SPECIALIST

## 2024-07-30 PROCEDURE — 3017F COLORECTAL CA SCREEN DOC REV: CPT | Performed by: CLINICAL NURSE SPECIALIST

## 2024-07-30 RX ORDER — INSULIN ASPART 100 [IU]/ML
INJECTION, SOLUTION INTRAVENOUS; SUBCUTANEOUS
Qty: 20 ML | Refills: 11 | Status: SHIPPED | OUTPATIENT
Start: 2024-07-30

## 2024-07-30 RX ORDER — LANCETS 33 GAUGE
EACH MISCELLANEOUS
Qty: 250 EACH | Refills: 3 | Status: SHIPPED | OUTPATIENT
Start: 2024-07-30

## 2024-07-30 RX ORDER — BLOOD-GLUCOSE SENSOR
EACH MISCELLANEOUS
Qty: 6 EACH | Refills: 1 | Status: SHIPPED | OUTPATIENT
Start: 2024-07-30

## 2024-07-30 NOTE — PROGRESS NOTES
[facility] Department of Endocrinology Diabetes and Metabolism   [address] [facility], [place], [place], [address]   Phone: [phone]  Fax: [phone]    Date of Service: 7/30/2024    Primary Care Physician: Keith Asencio DO  Referring physician: No ref. provider found  Provider: MONIKA Martines - CNS    Reason for the visit:  Type 1 DM       History of Present Illness:  The history is provided by the patient. No  was used. Accuracy of the patient data is excellent.  Reema Samuels is a very pleasant 54 y.o. female seen today for diabetes management     Reema Samuels was diagnosed with diabetes, type 1 at age 6  and currently on Novolog (Lispro ordered but has not started it as she is finshing her Novolog supply. She has been using a pump since 2000. She is currently using Medtronic revel 723 . She is not entering her BS into the pump. She does not like to use the Bolus Wizard and computes it in her head. She wants to determine her own rates and does not want provider assistance.     Insulin pump settings:  Insulin pump settings:  See scanned media         The patient has been checking blood sugar  4+ times a day via fingerstick with a Contour Next Link 2.4 glucometer .    Most recent A1c results summarized below  Lab Results   Component Value Date/Time    LABA1C 6.9 03/20/2024 01:23 PM    LABA1C 6.5 11/16/2023 03:37 PM    LABA1C 8.3 06/26/2023 02:26 PM        Patient reported hypoglycemic episodes. Usually in the morning.  good hypoglycemic awareness    The patient has been mindful of what has been eating and following diabetic diet as encouraged  I reviewed current medications and the patient has no issues with diabetes medications  Reema Samuels is up to date with eye exam and denied any history of diabetic retinopathy    Last eye exam was early  5/2024   The patient is not  seeing podiatrist.   And also performs  own feet care  Microvascular

## 2024-08-06 DIAGNOSIS — E10.9 TYPE 1 DIABETES MELLITUS WITHOUT COMPLICATION (HCC): Primary | ICD-10-CM

## 2024-08-07 RX ORDER — BLOOD-GLUCOSE METER
EACH MISCELLANEOUS
Qty: 1 KIT | Refills: 0 | Status: SHIPPED | OUTPATIENT
Start: 2024-08-07

## 2025-01-09 ENCOUNTER — OFFICE VISIT (OUTPATIENT)
Dept: ENDOCRINOLOGY | Age: 55
End: 2025-01-09
Payer: COMMERCIAL

## 2025-01-09 VITALS
DIASTOLIC BLOOD PRESSURE: 85 MMHG | OXYGEN SATURATION: 100 % | HEART RATE: 85 BPM | TEMPERATURE: 97.5 F | SYSTOLIC BLOOD PRESSURE: 145 MMHG | BODY MASS INDEX: 21.16 KG/M2 | WEIGHT: 115 LBS | HEIGHT: 62 IN

## 2025-01-09 DIAGNOSIS — Z96.41 PRESENCE OF INSULIN PUMP: ICD-10-CM

## 2025-01-09 DIAGNOSIS — Z91.119 DIETARY NONCOMPLIANCE: ICD-10-CM

## 2025-01-09 DIAGNOSIS — E10.9 TYPE 1 DIABETES MELLITUS WITHOUT COMPLICATION (HCC): Primary | ICD-10-CM

## 2025-01-09 LAB — HBA1C MFR BLD: 7.8 %

## 2025-01-09 PROCEDURE — 83036 HEMOGLOBIN GLYCOSYLATED A1C: CPT | Performed by: CLINICAL NURSE SPECIALIST

## 2025-01-09 PROCEDURE — 1036F TOBACCO NON-USER: CPT | Performed by: CLINICAL NURSE SPECIALIST

## 2025-01-09 PROCEDURE — 3051F HG A1C>EQUAL 7.0%<8.0%: CPT | Performed by: CLINICAL NURSE SPECIALIST

## 2025-01-09 PROCEDURE — G8420 CALC BMI NORM PARAMETERS: HCPCS | Performed by: CLINICAL NURSE SPECIALIST

## 2025-01-09 PROCEDURE — 3017F COLORECTAL CA SCREEN DOC REV: CPT | Performed by: CLINICAL NURSE SPECIALIST

## 2025-01-09 PROCEDURE — 99214 OFFICE O/P EST MOD 30 MIN: CPT | Performed by: CLINICAL NURSE SPECIALIST

## 2025-01-09 PROCEDURE — G2211 COMPLEX E/M VISIT ADD ON: HCPCS | Performed by: CLINICAL NURSE SPECIALIST

## 2025-01-09 PROCEDURE — 2022F DILAT RTA XM EVC RTNOPTHY: CPT | Performed by: CLINICAL NURSE SPECIALIST

## 2025-01-09 PROCEDURE — G8427 DOCREV CUR MEDS BY ELIG CLIN: HCPCS | Performed by: CLINICAL NURSE SPECIALIST

## 2025-01-09 RX ORDER — ACYCLOVIR 800 MG/1
TABLET ORAL
Qty: 2 EACH | Refills: 5 | Status: SHIPPED | OUTPATIENT
Start: 2025-01-09

## 2025-01-09 RX ORDER — INSULIN ASPART 100 [IU]/ML
INJECTION, SOLUTION INTRAVENOUS; SUBCUTANEOUS
Qty: 20 ML | Refills: 11 | Status: SHIPPED | OUTPATIENT
Start: 2025-01-09

## 2025-01-09 RX ORDER — KETOROLAC TROMETHAMINE 30 MG/ML
INJECTION, SOLUTION INTRAMUSCULAR; INTRAVENOUS
Qty: 1 EACH | Refills: 0 | Status: SHIPPED | OUTPATIENT
Start: 2025-01-09

## 2025-01-09 NOTE — PROGRESS NOTES
[facility] [employer] [facility] Department of Endocrinology Diabetes and Metabolism   [address] [facility], [place], [place], [address]   Phone: [phone]  Fax: [phone]    Date of Service: 1/9/2025    Primary Care Physician: Keith Asencio DO  Referring physician: No ref. provider found  Provider: MONIKA Martines - CNS    Reason for the visit:  Type 1 DM       History of Present Illness:  The history is provided by the patient. No  was used. Accuracy of the patient data is excellent.  Reema Samuels is a very pleasant 54 y.o. female seen today for diabetes management     Reema Samuels was diagnosed with diabetes, type 1 at age 6  and currently on Novolog (Lispro ordered but has not started it as she is finshing her Novolog supply. She has been using a pump since 2000. She is currently using Medtronic revel 723 . She is not entering her BS into the pump. She does not like to use the Bolus Wizard and computes it in her head. She wants to determine her own rates and does not want provider assistance.     Insulin pump settings:  Insulin pump settings:  See scanned media         The patient has been checking blood sugar  4+ times a day via fingerstick with a Contour Next Link 2.4 glucometer .    Most recent A1c results summarized below  Lab Results   Component Value Date/Time    LABA1C 7.8 01/09/2025 02:06 PM    LABA1C 6.9 03/20/2024 01:23 PM    LABA1C 6.5 11/16/2023 03:37 PM        Patient reported hypoglycemic episodes. Usually in the morning.  good hypoglycemic awareness    The patient has been mindful of what has been eating and following diabetic diet as encouraged  I reviewed current medications and the patient has no issues with diabetes medications  Reema Samuels is up to date with eye exam and denied any history of diabetic retinopathy    Last eye exam was early  5/2024   The patient is not  seeing podiatrist.   And also performs  own feet care  Microvascular

## 2025-05-13 ENCOUNTER — OFFICE VISIT (OUTPATIENT)
Dept: ENDOCRINOLOGY | Age: 55
End: 2025-05-13
Payer: COMMERCIAL

## 2025-05-13 VITALS
OXYGEN SATURATION: 99 % | SYSTOLIC BLOOD PRESSURE: 127 MMHG | DIASTOLIC BLOOD PRESSURE: 74 MMHG | TEMPERATURE: 98.1 F | HEART RATE: 111 BPM | WEIGHT: 113 LBS | BODY MASS INDEX: 20.67 KG/M2

## 2025-05-13 DIAGNOSIS — Z91.119 DIETARY NONCOMPLIANCE: ICD-10-CM

## 2025-05-13 DIAGNOSIS — Z96.41 PRESENCE OF INSULIN PUMP: ICD-10-CM

## 2025-05-13 DIAGNOSIS — E10.9 TYPE 1 DIABETES MELLITUS WITHOUT COMPLICATION (HCC): Primary | ICD-10-CM

## 2025-05-13 LAB — HBA1C MFR BLD: 6.8 %

## 2025-05-13 PROCEDURE — 3044F HG A1C LEVEL LT 7.0%: CPT | Performed by: CLINICAL NURSE SPECIALIST

## 2025-05-13 PROCEDURE — 99214 OFFICE O/P EST MOD 30 MIN: CPT | Performed by: CLINICAL NURSE SPECIALIST

## 2025-05-13 PROCEDURE — 2022F DILAT RTA XM EVC RTNOPTHY: CPT | Performed by: CLINICAL NURSE SPECIALIST

## 2025-05-13 PROCEDURE — 3017F COLORECTAL CA SCREEN DOC REV: CPT | Performed by: CLINICAL NURSE SPECIALIST

## 2025-05-13 PROCEDURE — G8420 CALC BMI NORM PARAMETERS: HCPCS | Performed by: CLINICAL NURSE SPECIALIST

## 2025-05-13 PROCEDURE — G8427 DOCREV CUR MEDS BY ELIG CLIN: HCPCS | Performed by: CLINICAL NURSE SPECIALIST

## 2025-05-13 PROCEDURE — G2211 COMPLEX E/M VISIT ADD ON: HCPCS | Performed by: CLINICAL NURSE SPECIALIST

## 2025-05-13 PROCEDURE — 83036 HEMOGLOBIN GLYCOSYLATED A1C: CPT | Performed by: CLINICAL NURSE SPECIALIST

## 2025-05-13 PROCEDURE — 1036F TOBACCO NON-USER: CPT | Performed by: CLINICAL NURSE SPECIALIST

## 2025-05-13 RX ORDER — INSULIN ASPART 100 [IU]/ML
INJECTION, SOLUTION INTRAVENOUS; SUBCUTANEOUS
Qty: 20 ML | Refills: 11 | Status: SHIPPED | OUTPATIENT
Start: 2025-05-13

## 2025-05-13 NOTE — PROGRESS NOTES
[facility] [employer] [facility] Department of Endocrinology Diabetes and Metabolism   [address], [place], [place], [address]   Phone: [phone]  Fax: [phone]    Date of Service: 5/13/2025    Primary Care Physician: Keith Asencio DO  Referring physician: No ref. provider found  Provider: MONIKA Martines - CNS    Reason for the visit:  Type 1 DM       History of Present Illness:  The history is provided by the patient. No  was used. Accuracy of the patient data is excellent.  Reema Samuels is a very pleasant 54 y.o. female seen today for diabetes management     Reema Samuels was diagnosed with diabetes, type 1 at age 6  and currently on Novolog (Lispro ordered but has not started it as she is finshing her Novolog supply. She has been using a pump since 2000. She is currently using Medtronic revel 723 . She is not entering her BS into the pump. She does not like to use the Bolus Wizard and computes it in her head. She wants to determine her own rates and does not want provider assistance.     Insulin pump settings:  Insulin pump settings:  See scanned media         The patient has been checking blood sugar  4+ times a day via fingerstick with a Contour Next Link 2.4 glucometer .    She needs new meter  Most recent A1c results summarized below  Lab Results   Component Value Date/Time    LABA1C 6.8 05/13/2025 02:10 PM    LABA1C 7.8 01/09/2025 02:06 PM    LABA1C 6.9 03/20/2024 01:23 PM        Patient reported hypoglycemic episodes. Usually in the morning.  good hypoglycemic awareness    The patient has been mindful of what has been eating and following diabetic diet as encouraged  I reviewed current medications and the patient has no issues with diabetes medications  Reema Samuels is up to date with eye exam and denied any history of diabetic retinopathy    Last eye exam was early  5/2024   The patient is not  seeing podiatrist.   And also performs  own feet

## 2025-05-28 DIAGNOSIS — E10.9 TYPE 1 DIABETES MELLITUS WITHOUT COMPLICATION (HCC): Primary | ICD-10-CM

## 2025-05-28 RX ORDER — BLOOD-GLUCOSE METER
EACH MISCELLANEOUS
Qty: 1 KIT | Refills: 0 | Status: SHIPPED | OUTPATIENT
Start: 2025-05-28

## 2025-05-28 RX ORDER — BLOOD SUGAR DIAGNOSTIC
STRIP MISCELLANEOUS
Qty: 100 EACH | Refills: 3 | Status: SHIPPED | OUTPATIENT
Start: 2025-05-28

## 2025-05-28 NOTE — TELEPHONE ENCOUNTER
Pt lm on rx line stating her insurance will no longer cover her current glucometer and test strips. Would like Accu check guide called in to Carol - covered under new plan

## 2025-07-31 DIAGNOSIS — E10.9 TYPE 1 DIABETES MELLITUS WITHOUT COMPLICATION (HCC): ICD-10-CM

## 2025-07-31 RX ORDER — BLOOD SUGAR DIAGNOSTIC
STRIP MISCELLANEOUS
Qty: 200 EACH | Refills: 4 | Status: SHIPPED | OUTPATIENT
Start: 2025-07-31